# Patient Record
Sex: MALE | Race: WHITE | NOT HISPANIC OR LATINO | Employment: FULL TIME | URBAN - METROPOLITAN AREA
[De-identification: names, ages, dates, MRNs, and addresses within clinical notes are randomized per-mention and may not be internally consistent; named-entity substitution may affect disease eponyms.]

---

## 2020-09-23 ENCOUNTER — OFFICE VISIT (OUTPATIENT)
Dept: INTERNAL MEDICINE | Facility: CLINIC | Age: 40
End: 2020-09-23
Payer: COMMERCIAL

## 2020-09-23 VITALS
HEIGHT: 72 IN | RESPIRATION RATE: 20 BRPM | TEMPERATURE: 99 F | DIASTOLIC BLOOD PRESSURE: 82 MMHG | OXYGEN SATURATION: 96 % | SYSTOLIC BLOOD PRESSURE: 136 MMHG | HEART RATE: 97 BPM | WEIGHT: 251.56 LBS | BODY MASS INDEX: 34.07 KG/M2

## 2020-09-23 DIAGNOSIS — M25.512 ACUTE PAIN OF BOTH SHOULDERS: ICD-10-CM
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
## 2020-09-23 DIAGNOSIS — M25.511 ACUTE PAIN OF BOTH SHOULDERS: ICD-10-CM

## 2020-09-23 PROCEDURE — 99999 PR PBB SHADOW E&M-NEW PATIENT-LVL IV: CPT | Mod: PBBFAC,,, | Performed by: NURSE PRACTITIONER

## 2020-09-23 PROCEDURE — 3008F BODY MASS INDEX DOCD: CPT | Mod: CPTII,S$GLB,, | Performed by: NURSE PRACTITIONER

## 2020-09-23 PROCEDURE — 3008F PR BODY MASS INDEX (BMI) DOCUMENTED: ICD-10-PCS | Mod: CPTII,S$GLB,, | Performed by: NURSE PRACTITIONER

## 2020-09-23 PROCEDURE — 99204 OFFICE O/P NEW MOD 45 MIN: CPT | Mod: S$GLB,,, | Performed by: NURSE PRACTITIONER

## 2020-09-23 PROCEDURE — 99999 PR PBB SHADOW E&M-NEW PATIENT-LVL IV: ICD-10-PCS | Mod: PBBFAC,,, | Performed by: NURSE PRACTITIONER

## 2020-09-23 PROCEDURE — 99204 PR OFFICE/OUTPT VISIT, NEW, LEVL IV, 45-59 MIN: ICD-10-PCS | Mod: S$GLB,,, | Performed by: NURSE PRACTITIONER
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
## 2020-09-28 PROBLEM — M25.512 ACUTE PAIN OF BOTH SHOULDERS: Status: ACTIVE | Noted: 2020-09-28

## 2020-09-28 PROBLEM — M25.511 ACUTE PAIN OF BOTH SHOULDERS: Status: ACTIVE | Noted: 2020-09-28
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
## 2023-03-14 ENCOUNTER — OFFICE VISIT (OUTPATIENT)
Dept: INTERNAL MEDICINE | Facility: CLINIC | Age: 43
End: 2023-03-14

## 2023-03-14 ENCOUNTER — LAB VISIT (OUTPATIENT)
Dept: LAB | Facility: HOSPITAL | Age: 43
End: 2023-03-14
Attending: NURSE PRACTITIONER

## 2023-03-14 VITALS
BODY MASS INDEX: 34.58 KG/M2 | DIASTOLIC BLOOD PRESSURE: 94 MMHG | HEART RATE: 88 BPM | OXYGEN SATURATION: 97 % | HEIGHT: 72 IN | TEMPERATURE: 98 F | SYSTOLIC BLOOD PRESSURE: 142 MMHG | WEIGHT: 255.31 LBS

## 2023-03-14 DIAGNOSIS — Z11.4 SCREENING FOR HIV WITHOUT PRESENCE OF RISK FACTORS: ICD-10-CM

## 2023-03-14 DIAGNOSIS — Z00.00 ROUTINE ADULT HEALTH MAINTENANCE: ICD-10-CM

## 2023-03-14 DIAGNOSIS — Z12.5 PROSTATE CANCER SCREENING: ICD-10-CM

## 2023-03-14 DIAGNOSIS — Z00.00 ROUTINE ADULT HEALTH MAINTENANCE: Primary | ICD-10-CM

## 2023-03-14 DIAGNOSIS — Z13.220 LIPID SCREENING: ICD-10-CM

## 2023-03-14 DIAGNOSIS — Z11.59 ENCOUNTER FOR HEPATITIS C SCREENING TEST FOR LOW RISK PATIENT: ICD-10-CM

## 2023-03-14 DIAGNOSIS — R03.0 ELEVATED BLOOD PRESSURE READING IN OFFICE WITHOUT DIAGNOSIS OF HYPERTENSION: ICD-10-CM

## 2023-03-14 PROBLEM — M25.512 ACUTE PAIN OF BOTH SHOULDERS: Status: RESOLVED | Noted: 2020-09-28 | Resolved: 2023-03-14
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
## 2023-03-14 PROBLEM — M25.511 ACUTE PAIN OF BOTH SHOULDERS: Status: RESOLVED | Noted: 2020-09-28 | Resolved: 2023-03-14

## 2023-03-14 LAB
ALBUMIN SERPL BCP-MCNC: 4.6 G/DL (ref 3.5–5.2)
ALP SERPL-CCNC: 65 U/L (ref 55–135)
ALT SERPL W/O P-5'-P-CCNC: 37 U/L (ref 10–44)
ANION GAP SERPL CALC-SCNC: 9 MMOL/L (ref 8–16)
AST SERPL-CCNC: 26 U/L (ref 10–40)
BASOPHILS # BLD AUTO: 0.05 K/UL (ref 0–0.2)
BASOPHILS NFR BLD: 0.8 % (ref 0–1.9)
BILIRUB SERPL-MCNC: 0.5 MG/DL (ref 0.1–1)
BUN SERPL-MCNC: 13 MG/DL (ref 6–20)
CALCIUM SERPL-MCNC: 9.4 MG/DL (ref 8.7–10.5)
CHLORIDE SERPL-SCNC: 107 MMOL/L (ref 95–110)
CO2 SERPL-SCNC: 25 MMOL/L (ref 23–29)
CREAT SERPL-MCNC: 0.9 MG/DL (ref 0.5–1.4)
DIFFERENTIAL METHOD: ABNORMAL
EOSINOPHIL # BLD AUTO: 0.3 K/UL (ref 0–0.5)
EOSINOPHIL NFR BLD: 4.3 % (ref 0–8)
ERYTHROCYTE [DISTWIDTH] IN BLOOD BY AUTOMATED COUNT: 11.8 % (ref 11.5–14.5)
EST. GFR  (NO RACE VARIABLE): >60 ML/MIN/1.73 M^2
GLUCOSE SERPL-MCNC: 93 MG/DL (ref 70–110)
HCT VFR BLD AUTO: 45.8 % (ref 40–54)
HCV AB SERPL QL IA: NEGATIVE
HGB BLD-MCNC: 16.4 G/DL (ref 14–18)
HIV 1+2 AB+HIV1 P24 AG SERPL QL IA: NEGATIVE
IMM GRANULOCYTES # BLD AUTO: 0.02 K/UL (ref 0–0.04)
IMM GRANULOCYTES NFR BLD AUTO: 0.3 % (ref 0–0.5)
LYMPHOCYTES # BLD AUTO: 2.1 K/UL (ref 1–4.8)
LYMPHOCYTES NFR BLD: 33.9 % (ref 18–48)
MCH RBC QN AUTO: 33.3 PG (ref 27–31)
MCHC RBC AUTO-ENTMCNC: 35.8 G/DL (ref 32–36)
MCV RBC AUTO: 93 FL (ref 82–98)
MONOCYTES # BLD AUTO: 0.4 K/UL (ref 0.3–1)
MONOCYTES NFR BLD: 6.6 % (ref 4–15)
NEUTROPHILS # BLD AUTO: 3.4 K/UL (ref 1.8–7.7)
NEUTROPHILS NFR BLD: 54.1 % (ref 38–73)
NRBC BLD-RTO: 0 /100 WBC
PLATELET # BLD AUTO: 257 K/UL (ref 150–450)
PMV BLD AUTO: 8.9 FL (ref 9.2–12.9)
POTASSIUM SERPL-SCNC: 4.4 MMOL/L (ref 3.5–5.1)
PROT SERPL-MCNC: 7.5 G/DL (ref 6–8.4)
RBC # BLD AUTO: 4.93 M/UL (ref 4.6–6.2)
SODIUM SERPL-SCNC: 141 MMOL/L (ref 136–145)
WBC # BLD AUTO: 6.22 K/UL (ref 3.9–12.7)

## 2023-03-14 PROCEDURE — 83036 HEMOGLOBIN GLYCOSYLATED A1C: CPT | Performed by: NURSE PRACTITIONER

## 2023-03-14 PROCEDURE — 84153 ASSAY OF PSA TOTAL: CPT | Performed by: NURSE PRACTITIONER

## 2023-03-14 PROCEDURE — 99396 PR PREVENTIVE VISIT,EST,40-64: ICD-10-PCS | Mod: S$PBB,,, | Performed by: NURSE PRACTITIONER

## 2023-03-14 PROCEDURE — 85025 COMPLETE CBC W/AUTO DIFF WBC: CPT | Mod: PO | Performed by: NURSE PRACTITIONER

## 2023-03-14 PROCEDURE — 99999 PR PBB SHADOW E&M-EST. PATIENT-LVL III: ICD-10-PCS | Mod: PBBFAC,,, | Performed by: NURSE PRACTITIONER

## 2023-03-14 PROCEDURE — 99213 OFFICE O/P EST LOW 20 MIN: CPT | Mod: PBBFAC,PO | Performed by: NURSE PRACTITIONER

## 2023-03-14 PROCEDURE — 87389 HIV-1 AG W/HIV-1&-2 AB AG IA: CPT | Performed by: NURSE PRACTITIONER

## 2023-03-14 PROCEDURE — 36415 COLL VENOUS BLD VENIPUNCTURE: CPT | Mod: PO | Performed by: NURSE PRACTITIONER

## 2023-03-14 PROCEDURE — 99999 PR PBB SHADOW E&M-EST. PATIENT-LVL III: CPT | Mod: PBBFAC,,, | Performed by: NURSE PRACTITIONER

## 2023-03-14 PROCEDURE — 80061 LIPID PANEL: CPT | Performed by: NURSE PRACTITIONER

## 2023-03-14 PROCEDURE — 80053 COMPREHEN METABOLIC PANEL: CPT | Mod: PO | Performed by: NURSE PRACTITIONER

## 2023-03-14 PROCEDURE — 99396 PREV VISIT EST AGE 40-64: CPT | Mod: S$PBB,,, | Performed by: NURSE PRACTITIONER

## 2023-03-14 PROCEDURE — 86803 HEPATITIS C AB TEST: CPT | Performed by: NURSE PRACTITIONER

## 2023-03-14 NOTE — ASSESSMENT & PLAN NOTE
No concerns on physical exam except elevated BP. Follow up in two weeks with BP log to make sure home readings are elevated also. Discussed BP goal. Routine labs today.

## 2023-03-14 NOTE — PROGRESS NOTES
Subjective:       Patient ID: Vince Rosas is a 42 y.o. male.    Chief Complaint: Annual Exam    Mr Rosas presents to visit for annual wellness visit and labs. No acute complaints today. BP elevated today. Reports that it is elevated when they take it at work also. Improves with weight loss. Plans to start intermittent fasting again and exercising. Had significant weight loss with this method in past.     Regular dental and eye exams.       Patient Active Problem List   Diagnosis    Routine adult health maintenance    Elevated blood pressure reading in office without diagnosis of hypertension       Family History   Problem Relation Age of Onset    COPD Mother     Parkinsonism Mother      History reviewed. No pertinent surgical history.    No current outpatient medications on file.    Review of Systems   Constitutional:  Negative for chills and fever.   Eyes:  Negative for visual disturbance.   Respiratory:  Negative for cough and shortness of breath.    Cardiovascular:  Negative for chest pain, palpitations and leg swelling.   Gastrointestinal:  Negative for abdominal pain, constipation, diarrhea, nausea and vomiting.   Genitourinary:  Negative for frequency and urgency.   Musculoskeletal:  Negative for arthralgias, gait problem and myalgias.   Neurological:  Negative for dizziness, light-headedness, numbness and headaches.   Psychiatric/Behavioral:  Negative for dysphoric mood and sleep disturbance. The patient is not nervous/anxious.      Objective:   BP (!) 142/94 (BP Location: Left arm, Patient Position: Sitting, BP Method: Medium (Manual))   Pulse 88   Temp 97.7 °F (36.5 °C) (Temporal)   Ht 6' (1.829 m)   Wt 115.8 kg (255 lb 4.7 oz)   SpO2 97%   BMI 34.62 kg/m²      Physical Exam  Vitals reviewed.   Constitutional:       General: He is not in acute distress.     Appearance: Normal appearance. He is well-developed. He is not ill-appearing or diaphoretic.   HENT:      Head: Normocephalic.      Right Ear:  Tympanic membrane normal.      Left Ear: Tympanic membrane normal.      Mouth/Throat:      Mouth: Mucous membranes are moist.      Pharynx: Oropharynx is clear. No oropharyngeal exudate or posterior oropharyngeal erythema.   Eyes:      General:         Right eye: No discharge.         Left eye: No discharge.      Extraocular Movements: Extraocular movements intact.      Conjunctiva/sclera: Conjunctivae normal.      Pupils: Pupils are equal, round, and reactive to light.   Cardiovascular:      Rate and Rhythm: Normal rate and regular rhythm.      Pulses: Normal pulses.      Heart sounds: Normal heart sounds. No murmur heard.    No friction rub. No gallop.   Pulmonary:      Effort: Pulmonary effort is normal. No respiratory distress.      Breath sounds: Normal breath sounds. No rales.   Abdominal:      Palpations: Abdomen is soft.      Tenderness: There is no abdominal tenderness.   Musculoskeletal:         General: Normal range of motion.      Cervical back: Normal range of motion and neck supple. No tenderness.      Right lower leg: No edema.      Left lower leg: No edema.   Lymphadenopathy:      Cervical: No cervical adenopathy.   Skin:     General: Skin is warm and dry.      Coloration: Skin is not pale.      Findings: No erythema.   Neurological:      Mental Status: He is alert and oriented to person, place, and time.   Psychiatric:         Mood and Affect: Mood normal.         Behavior: Behavior normal.       Assessment & Plan     Problem List Items Addressed This Visit          Cardiac/Vascular    Elevated blood pressure reading in office without diagnosis of hypertension    Current Assessment & Plan     BP log for next two weeks. Follow up for nurse visit.             Other    Routine adult health maintenance - Primary    Current Assessment & Plan     No concerns on physical exam except elevated BP. Follow up in two weeks with BP log to make sure home readings are elevated also. Discussed BP goal. Routine labs  "today.          Relevant Orders    CBC Auto Differential (Completed)    HIV 1/2 Ag/Ab (4th Gen)    Hemoglobin A1C    PSA, Screening    Hepatitis C Antibody    COMPREHENSIVE METABOLIC PANEL (Completed)     Other Visit Diagnoses       Prostate cancer screening        Relevant Orders    PSA, Screening    Encounter for hepatitis C screening test for low risk patient        Relevant Orders    Hepatitis C Antibody    Screening for HIV without presence of risk factors        Relevant Orders    HIV 1/2 Ag/Ab (4th Gen)    Lipid screening        Relevant Orders    Lipid Panel            Follow up in about 2 weeks (around 3/28/2023) for hypertension nurse visit. .          Portions of this note may have been created with voice recognition software. Occasional "wrong-word" or "sound-a-like" substitutions may have occurred due to the inherent limitations of voice recognition software. Please, read the note carefully and recognize, using context, where substitutions have occurred.       "

## 2023-03-15 LAB
CHOLEST SERPL-MCNC: 191 MG/DL (ref 120–199)
CHOLEST/HDLC SERPL: 4 {RATIO} (ref 2–5)
COMPLEXED PSA SERPL-MCNC: 0.79 NG/ML (ref 0–4)
ESTIMATED AVG GLUCOSE: 94 MG/DL (ref 68–131)
HBA1C MFR BLD: 4.9 % (ref 4–5.6)
HDLC SERPL-MCNC: 48 MG/DL (ref 40–75)
HDLC SERPL: 25.1 % (ref 20–50)
LDLC SERPL CALC-MCNC: 109.2 MG/DL (ref 63–159)
NONHDLC SERPL-MCNC: 143 MG/DL
TRIGL SERPL-MCNC: 169 MG/DL (ref 30–150)

## 2023-06-19 PROBLEM — Z00.00 ROUTINE ADULT HEALTH MAINTENANCE: Status: RESOLVED | Noted: 2023-03-14 | Resolved: 2023-06-19

## 2024-12-05 ENCOUNTER — OFFICE VISIT (OUTPATIENT)
Dept: INTERNAL MEDICINE | Facility: CLINIC | Age: 44
End: 2024-12-05
Payer: COMMERCIAL

## 2024-12-05 ENCOUNTER — HOSPITAL ENCOUNTER (OUTPATIENT)
Dept: RADIOLOGY | Facility: HOSPITAL | Age: 44
Discharge: HOME | End: 2024-12-05
Attending: NURSE PRACTITIONER
Payer: COMMERCIAL

## 2024-12-05 VITALS
HEART RATE: 87 BPM | TEMPERATURE: 96 F | SYSTOLIC BLOOD PRESSURE: 144 MMHG | OXYGEN SATURATION: 95 % | RESPIRATION RATE: 18 BRPM | HEIGHT: 72 IN | BODY MASS INDEX: 36.28 KG/M2 | DIASTOLIC BLOOD PRESSURE: 94 MMHG | WEIGHT: 267.88 LBS

## 2024-12-05 DIAGNOSIS — M54.42 ACUTE LEFT-SIDED LOW BACK PAIN WITH LEFT-SIDED SCIATICA: Primary | ICD-10-CM

## 2024-12-05 DIAGNOSIS — R06.83 SNORES: ICD-10-CM

## 2024-12-05 DIAGNOSIS — I10 HYPERTENSION, UNSPECIFIED TYPE: ICD-10-CM

## 2024-12-05 DIAGNOSIS — M54.42 ACUTE LEFT-SIDED LOW BACK PAIN WITH LEFT-SIDED SCIATICA: ICD-10-CM

## 2024-12-05 PROCEDURE — 3077F SYST BP >= 140 MM HG: CPT | Mod: CPTII,S$GLB,, | Performed by: NURSE PRACTITIONER

## 2024-12-05 PROCEDURE — 96372 THER/PROPH/DIAG INJ SC/IM: CPT | Mod: S$GLB,,, | Performed by: NURSE PRACTITIONER

## 2024-12-05 PROCEDURE — 3080F DIAST BP >= 90 MM HG: CPT | Mod: CPTII,S$GLB,, | Performed by: NURSE PRACTITIONER

## 2024-12-05 PROCEDURE — 99999 PR PBB SHADOW E&M-EST. PATIENT-LVL IV: CPT | Mod: PBBFAC,,, | Performed by: NURSE PRACTITIONER

## 2024-12-05 PROCEDURE — 72100 X-RAY EXAM L-S SPINE 2/3 VWS: CPT | Mod: TC,PO

## 2024-12-05 PROCEDURE — 1159F MED LIST DOCD IN RCRD: CPT | Mod: CPTII,S$GLB,, | Performed by: NURSE PRACTITIONER

## 2024-12-05 PROCEDURE — 3008F BODY MASS INDEX DOCD: CPT | Mod: CPTII,S$GLB,, | Performed by: NURSE PRACTITIONER

## 2024-12-05 PROCEDURE — 99214 OFFICE O/P EST MOD 30 MIN: CPT | Mod: 25,S$GLB,, | Performed by: NURSE PRACTITIONER

## 2024-12-05 PROCEDURE — 72100 X-RAY EXAM L-S SPINE 2/3 VWS: CPT | Mod: 26,,, | Performed by: RADIOLOGY

## 2024-12-05 RX ORDER — METHOCARBAMOL 500 MG/1
500 TABLET, FILM COATED ORAL 3 TIMES DAILY PRN
Qty: 30 TABLET | Refills: 0 | Status: SHIPPED | OUTPATIENT
Start: 2024-12-05

## 2024-12-05 RX ORDER — NAPROXEN 500 MG/1
500 TABLET ORAL 2 TIMES DAILY PRN
Qty: 30 TABLET | Refills: 0 | Status: SHIPPED | OUTPATIENT
Start: 2024-12-05

## 2024-12-05 RX ORDER — AMLODIPINE BESYLATE 5 MG/1
5 TABLET ORAL DAILY
Qty: 30 TABLET | Refills: 0 | Status: SHIPPED | OUTPATIENT
Start: 2024-12-05

## 2024-12-05 RX ORDER — METHYLPREDNISOLONE 4 MG/1
TABLET ORAL
Qty: 21 TABLET | Refills: 0 | Status: SHIPPED | OUTPATIENT
Start: 2024-12-05

## 2024-12-05 RX ORDER — KETOROLAC TROMETHAMINE 30 MG/ML
30 INJECTION, SOLUTION INTRAMUSCULAR; INTRAVENOUS
Status: COMPLETED | OUTPATIENT
Start: 2024-12-05 | End: 2024-12-05

## 2024-12-05 RX ADMIN — KETOROLAC TROMETHAMINE 30 MG: 30 INJECTION, SOLUTION INTRAMUSCULAR; INTRAVENOUS at 10:12

## 2024-12-05 NOTE — PROGRESS NOTES
Subjective:       Patient ID: Vince Rosas is a 44 y.o. male.    Chief Complaint: Back Pain    Mr. Rosas presents for complaint of back pain that has been going on for approximately 1-2 months.  Exacerbated when he was picking up on dear feed over the past few weeks.  Currently rated a 5/10.  Worse with movements and turning.  Patient has tried OTC Tylenol, anti-inflammatories, and muscle relaxers with minimal relief.  Denies any urinary or GI symptoms.      BP elevated today.  Patient reports that his blood pressure is always elevated at work physicals as well.  No current medication treatment.    Patient would also like to inquire about possible sleep study.  He reports that he often has daytime fatigue, and also is told that he snores all the time. Has never complete sleep study.       Back Pain  This is a new problem. The current episode started more than 1 month ago. The problem occurs intermittently. The problem has been waxing and waning since onset. The pain is present in the lumbar spine. The quality of the pain is described as aching. The pain is at a severity of 5/10. The pain is moderate. The symptoms are aggravated by lying down, twisting, sitting, bending and position. Associated symptoms include tingling (left). Pertinent negatives include no abdominal pain, bladder incontinence, bowel incontinence, chest pain, dysuria, fever, headaches, numbness or paresis. He has tried muscle relaxant and walking for the symptoms.       Patient Active Problem List   Diagnosis    Elevated blood pressure reading in office without diagnosis of hypertension    Gastroesophageal reflux disease without esophagitis    Hypertension    Acute left-sided low back pain with left-sided sciatica       History reviewed. No pertinent surgical history.      Current Outpatient Medications:     amLODIPine (NORVASC) 5 MG tablet, Take 1 tablet (5 mg total) by mouth once daily., Disp: 30 tablet, Rfl: 0    methocarbamoL (ROBAXIN) 500 MG  Tab, Take 1 tablet (500 mg total) by mouth 3 (three) times daily as needed (muscle pain). May cause drowsiness., Disp: 30 tablet, Rfl: 0    methylPREDNISolone (MEDROL DOSEPACK) 4 mg tablet, follow package directions, Disp: 21 tablet, Rfl: 0    naproxen (NAPROSYN) 500 MG tablet, Take 1 tablet (500 mg total) by mouth 2 (two) times daily as needed., Disp: 30 tablet, Rfl: 0    Review of Systems   Constitutional:  Negative for fever.   Cardiovascular:  Negative for chest pain.   Gastrointestinal:  Negative for abdominal pain and bowel incontinence.   Genitourinary:  Negative for bladder incontinence and dysuria.   Musculoskeletal:  Positive for back pain.   Neurological:  Positive for tingling (left). Negative for numbness and headaches.       Objective:   BP (!) 144/94 (BP Location: Left arm, Patient Position: Sitting)   Pulse 87   Temp 96.4 °F (35.8 °C) (Tympanic)   Resp 18   Ht 6' (1.829 m)   Wt 121.5 kg (267 lb 13.7 oz)   SpO2 95%   BMI 36.33 kg/m²      Physical Exam  Vitals reviewed.   Constitutional:       General: He is not in acute distress.     Appearance: Normal appearance. He is well-developed. He is obese. He is not ill-appearing or diaphoretic.   HENT:      Head: Normocephalic.   Cardiovascular:      Rate and Rhythm: Normal rate and regular rhythm.      Pulses: Normal pulses.      Heart sounds: Normal heart sounds. No murmur heard.     No friction rub. No gallop.   Pulmonary:      Effort: Pulmonary effort is normal. No respiratory distress.      Breath sounds: Normal breath sounds. No rales.   Musculoskeletal:      Cervical back: Normal range of motion and neck supple.      Lumbar back: Tenderness present. Decreased range of motion. Positive left straight leg raise test. Negative right straight leg raise test.   Skin:     General: Skin is warm and dry.      Coloration: Skin is not pale.      Findings: No erythema.   Neurological:      Mental Status: He is alert and oriented to person, place, and time.  "  Psychiatric:         Mood and Affect: Mood normal.         Behavior: Behavior normal.         Assessment & Plan     Problem List Items Addressed This Visit          Cardiac/Vascular    Hypertension    Current Assessment & Plan     Reports BP is always elevated. Will add on amlodipine. Follow up in two weeks nurse visit.          Relevant Medications    amLODIPine (NORVASC) 5 MG tablet       Orthopedic    Acute left-sided low back pain with left-sided sciatica - Primary    Current Assessment & Plan     Stretches  Heating pad  Massages  NSAID/muscle relaxer rx.   Referral to PT.          Relevant Medications    methocarbamoL (ROBAXIN) 500 MG Tab    naproxen (NAPROSYN) 500 MG tablet    methylPREDNISolone (MEDROL DOSEPACK) 4 mg tablet    Other Relevant Orders    X-Ray Lumbar Spine 2 Or 3 Views (Completed)     Other Visit Diagnoses       Snores        Relevant Orders    Home Sleep Study          Follow up in about 2 weeks (around 12/19/2024), or if symptoms worsen or fail to improve, for hypertension nurse visit. .            Portions of this note may have been created with voice recognition software. Occasional "wrong-word" or "sound-a-like" substitutions may have occurred due to the inherent limitations of voice recognition software. Please, read the note carefully and recognize, using context, where substitutions have occurred.     "

## 2024-12-06 ENCOUNTER — TELEPHONE (OUTPATIENT)
Dept: PULMONOLOGY | Facility: CLINIC | Age: 44
End: 2024-12-06
Payer: COMMERCIAL

## 2024-12-06 NOTE — TELEPHONE ENCOUNTER
----- Message from Zeny Joel sent at 12/5/2024 10:47 AM CST -----  Review Chart, [facility]

## 2024-12-11 DIAGNOSIS — I10 HYPERTENSION, UNSPECIFIED TYPE: ICD-10-CM

## 2024-12-16 PROBLEM — I10 HYPERTENSION: Status: ACTIVE | Noted: 2024-12-16

## 2024-12-16 PROBLEM — M54.42 ACUTE LEFT-SIDED LOW BACK PAIN WITH LEFT-SIDED SCIATICA: Status: ACTIVE | Noted: 2024-12-16

## 2024-12-31 ENCOUNTER — HOSPITAL ENCOUNTER (OUTPATIENT)
Dept: SLEEP MEDICINE | Facility: HOSPITAL | Age: 44
Discharge: HOME | End: 2024-12-31
Payer: COMMERCIAL

## 2024-12-31 DIAGNOSIS — G47.33 OSA (OBSTRUCTIVE SLEEP APNEA): Primary | ICD-10-CM

## 2024-12-31 DIAGNOSIS — R06.83 SNORES: ICD-10-CM

## 2024-12-31 PROCEDURE — 95806 SLEEP STUDY UNATT&RESP EFFT: CPT | Performed by: INTERNAL MEDICINE

## 2025-01-02 DIAGNOSIS — G47.33 OSA (OBSTRUCTIVE SLEEP APNEA): Primary | ICD-10-CM

## 2025-01-02 PROBLEM — R06.83 SNORES: Status: ACTIVE | Noted: 2025-01-02

## 2025-01-02 PROCEDURE — 95806 SLEEP STUDY UNATT&RESP EFFT: CPT | Mod: 26,,, | Performed by: INTERNAL MEDICINE

## 2025-01-02 NOTE — PROCEDURES
PHYSICIAN INTERPRETATION AND COMMENTS: Findings are consistent with moderate, positional obstructive sleep  apnea(CANDIDA). Please refer to Sleep Disorder Clinic for management.  CLINICAL HISTORY: 44 year old male. BMI 36.33 kg per m2. Snoring and daytime fatigue.  SLEEP STUDY FINDINGS: Patient underwent a 1 night Home Sleep Test and by behavioral criteria, slept for approximately  5.08 hours, with a sleep latency of 6 minutes and a sleep efficiency of 99%. Moderate sleep disordered breathing (AHI=15)  is noted based on a 4% hypopnea desaturation criteria, predominantly in the supine position (24 events/hour). The patient  slept supine 44.7% of the night based on valid recording time of 4.23 hours and is 3 times as likely to have  apneas/hypopneas when supine. When considering more subtle measures of sleep disordered breathing, the overall  respiratory disturbance index is moderate(RDI=29) based on a 1% hypopnea desaturation criteria with confirmation by  surrogate arousal indicators. The apneas/hypopneas are accompanied by minimal oxygen desaturation (percent time  below 90% SpO2: 3%, Min SpO2: 69.5%). The average desaturation across all sleep disordered breathing events is 4.5%.  Snoring occurs for 47.9% (30 dB) of the study, 34.9% is very loud. The mean pulse rate is 69 BPM, with frequent pulse rate  variability (59 events with >= 6 BPM increase/decrease per hour).  TREATMENT CONSIDERATIONS: 1) Auto-CPAP set 4-20 cm H2O with heated humidity and mask/interface fitting. Close  follow up and monitoring is recommended to adjust pressures/masks if necessary 2) Alternate treatment options  including oral appliance therapy (OAT), daytime neuromuscular stimulation Positional therapy, and/or surgical  procedures for CANDIDA may be considered based on severity and comorbidities, if PAP is not tolerated or in combination with  PAP 3) Avoid alcohol, sedatives and other CNS depressants that may worsen sleep apnea and disrupt normal  "sleep  architecture. 4) Sleep hygiene should be reviewed to assess factors that may improve sleep quality. 5) If the patient has a  BMI > 25, weight management and regular exercise should be initiated or continued. 6) Avoid driving and handling  machinery/equipment if sleepy    [place], [place]  [address] [address]  [place] [address]/Marie Hinkle NP         The sleep study that you ordered is complete.  You have ordered sleep LAB services to perform the sleep study for Vince Rosas .      Please find Sleep Study result in  the "Media tab" of Chart Review menu.        You can look  for the report in the  Media by the document type "Sleep Study Documents". Alphabetizing  "Document type" column helps to find the SLEEP STUDY report  Faster.       As the ordering provider, you are responsible for reviewing the results and implementing a treatment plan with your patient.    If you need a Sleep Medicine provider to explain the sleep study findings and arrange treatment for the patient, please refer patient for consultation to our Sleep Clinic via [facility] with Ambulatory Consult Sleep.     To do that please place an order for an  "Ambulatory Consult Sleep" -  order , it will go to our clinic work queue for our staff  to contact the patient for an appointment.      For any questions, please contact our sleep lab  staff at [phone] to talk to clinical staff          Niko Vu MD   "

## 2025-01-03 DIAGNOSIS — M54.42 ACUTE LEFT-SIDED LOW BACK PAIN WITH LEFT-SIDED SCIATICA: Primary | ICD-10-CM

## 2025-01-08 ENCOUNTER — OFFICE VISIT (OUTPATIENT)
Dept: INTERNAL MEDICINE | Facility: CLINIC | Age: 45
End: 2025-01-08
Payer: COMMERCIAL

## 2025-01-08 VITALS
DIASTOLIC BLOOD PRESSURE: 110 MMHG | OXYGEN SATURATION: 96 % | RESPIRATION RATE: 18 BRPM | WEIGHT: 270.94 LBS | BODY MASS INDEX: 36.7 KG/M2 | TEMPERATURE: 98 F | HEART RATE: 100 BPM | HEIGHT: 72 IN | SYSTOLIC BLOOD PRESSURE: 142 MMHG

## 2025-01-08 DIAGNOSIS — M51.369 BULGE OF LUMBAR DISC WITHOUT MYELOPATHY: ICD-10-CM

## 2025-01-08 DIAGNOSIS — M54.16 LUMBAR RADICULOPATHY: Primary | ICD-10-CM

## 2025-01-08 DIAGNOSIS — I10 ESSENTIAL HYPERTENSION: ICD-10-CM

## 2025-01-08 DIAGNOSIS — M54.16 LUMBAR RADICULOPATHY, CHRONIC: ICD-10-CM

## 2025-01-08 DIAGNOSIS — I10 HYPERTENSION, UNSPECIFIED TYPE: ICD-10-CM

## 2025-01-08 PROCEDURE — 3077F SYST BP >= 140 MM HG: CPT | Mod: CPTII,S$GLB,, | Performed by: NURSE PRACTITIONER

## 2025-01-08 PROCEDURE — 3008F BODY MASS INDEX DOCD: CPT | Mod: CPTII,S$GLB,, | Performed by: NURSE PRACTITIONER

## 2025-01-08 PROCEDURE — 99999 PR PBB SHADOW E&M-EST. PATIENT-LVL IV: CPT | Mod: PBBFAC,,, | Performed by: NURSE PRACTITIONER

## 2025-01-08 PROCEDURE — 99214 OFFICE O/P EST MOD 30 MIN: CPT | Mod: S$GLB,,, | Performed by: NURSE PRACTITIONER

## 2025-01-08 PROCEDURE — 3080F DIAST BP >= 90 MM HG: CPT | Mod: CPTII,S$GLB,, | Performed by: NURSE PRACTITIONER

## 2025-01-08 RX ORDER — AMLODIPINE BESYLATE 10 MG/1
10 TABLET ORAL DAILY
Qty: 30 TABLET | Refills: 0 | Status: SHIPPED | OUTPATIENT
Start: 2025-01-08 | End: 2025-01-15 | Stop reason: SDUPTHER

## 2025-01-08 NOTE — PROGRESS NOTES
Subjective:       Patient ID: Vince Rosas is a 44 y.o. male.    Chief Complaint: Back Pain      History of Present Illness    CHIEF COMPLAINT:  - Mr. Rosas presents with back pain, knee pain, and leg numbness/tingling, seeking follow-up on physical therapy and considering work leave due to pain and functional limitations.    HPI:  Mr. Rosas reports back pain, knee pain, and overall body aches, expressing a sense of premature aging. He has begun physical therapy with [facility], attending two sessions. The physical therapist noted some improvement but expressed concern about the patient returning to work, particularly due to prolonged sitting exacerbating the condition.    Mr. Rosas describes numbness and tingling along the entire left side of his leg, which became particularly noticeable after a recent physical therapy session involving dry needling. After standing following the treatment, he felt tingling throughout the affected area.    The symptoms are impacting his ability to work, necessitating a decision about whether to take leave or continue working. He expresses concern about his ability to perform work tasks, especially those involving sitting for extended periods. Mr. Rosas also reports difficulty climbing stairs, stating it caused significant pain and potentially worsened his condition.    Mr. Rosas's leg occasionally feels weak and gives out, though this is not constant. He has made adjustments at work, such as elevating his workstation to allow for standing and movement.    Prior to starting physical therapy, the patient visited an urgent care facility once, and also office visit here due to his symptoms. X-rays were taken, which did not show any abnormalities.    IMAGING:  - X-ray Lumbar Spine: Completed prior to the conversation, negative    SOCIAL HISTORY:  - Occupation: Currently employed, job involves sitting for long periods    EXAM: XR LUMBAR SPINE 2 OR 3 VIEWS     CLINICAL HISTORY:  [M54.42]-Lumbago  with sciatica, left side.     Lumbar spine x-ray, 3 views     COMPARISON: None.        FINDINGS:     Normal alignment and normal lumbar vertebral body heights.  Disc spaces well preserved.  Vertebral soft tissues demonstrates minimal vascular calcification of aorta.        Impression:        Negative lumbar spine exam.     Finalized on: 12/5/2024 5:55 PM By:  Victor M Mccracken MD  Patient Active Problem List   Diagnosis    Elevated blood pressure reading in office without diagnosis of hypertension    Gastroesophageal reflux disease without esophagitis    Acute left-sided low back pain with left-sided sciatica    Snores    Essential hypertension    BMI 36.0-36.9,adult    CANDIDA (obstructive sleep apnea)         History reviewed. No pertinent surgical history.      Current Outpatient Medications:     methocarbamoL (ROBAXIN) 500 MG Tab, Take 1 tablet (500 mg total) by mouth 3 (three) times daily as needed (muscle pain). May cause drowsiness., Disp: 30 tablet, Rfl: 0    amLODIPine (NORVASC) 10 MG tablet, Take 1 tablet (10 mg total) by mouth once daily., Disp: 90 tablet, Rfl: 3    naproxen (NAPROSYN) 500 MG tablet, Take 1 tablet (500 mg total) by mouth 2 (two) times daily as needed., Disp: 40 tablet, Rfl: 5    Review of Systems   Constitutional:  Negative for appetite change, chills, fatigue and fever.   Respiratory:  Negative for cough and shortness of breath.    Cardiovascular:  Negative for chest pain and palpitations.   Gastrointestinal:  Negative for abdominal pain, constipation, diarrhea, nausea and vomiting.   Genitourinary:  Negative for dysuria, flank pain, frequency and urgency.   Musculoskeletal:  Positive for back pain, gait problem and myalgias.   Neurological:  Positive for weakness (l leg, intermittent) and numbness. Negative for dizziness, seizures, speech difficulty, light-headedness and headaches.       Objective:   BP (!) 142/110 (BP Location: Left arm, Patient Position: Sitting)   Pulse 100   Temp  98.2 °F (36.8 °C) (Oral)   Resp 18   Ht 6' (1.829 m)   Wt 122.9 kg (270 lb 15.1 oz)   SpO2 96%   BMI 36.75 kg/m²      Physical Exam  Vitals reviewed.   Constitutional:       General: He is not in acute distress.     Appearance: Normal appearance. He is well-developed. He is obese. He is not ill-appearing or diaphoretic.   HENT:      Head: Normocephalic.   Cardiovascular:      Rate and Rhythm: Normal rate and regular rhythm.      Pulses: Normal pulses.      Heart sounds: Normal heart sounds. No murmur heard.     No friction rub. No gallop.   Pulmonary:      Effort: Pulmonary effort is normal. No respiratory distress.      Breath sounds: Normal breath sounds. No rales.   Musculoskeletal:      Lumbar back: Tenderness present. Decreased range of motion. Positive left straight leg raise test. Negative right straight leg raise test.   Skin:     General: Skin is warm and dry.      Coloration: Skin is not pale.      Findings: No erythema.   Neurological:      Mental Status: He is alert and oriented to person, place, and time.   Psychiatric:         Mood and Affect: Mood normal.         Behavior: Behavior normal.         Assessment & Plan     Problem List Items Addressed This Visit          Cardiac/Vascular    Essential hypertension    Current Assessment & Plan     Blood pressure stable on current medication.  Continue current dose.         RESOLVED: Hypertension    Relevant Medications    amLODIPine (NORVASC) 10 MG tablet     Other Visit Diagnoses       Lumbar radiculopathy    -  Primary    Lumbar radiculopathy, chronic        Relevant Orders    MRI Lumbar Spine Without Contrast (Completed)            Assessment & Plan    MEDICAL DECISION MAKING:  - Considering MRI to evaluate for potential disc issues, despite negative x-ray, due to worsening symptoms including radiation down left leg and tingling  - Assessing patient's ability to continue work given current symptoms and physical therapy progress  - Evaluating need  "for medical leave to allow for proper recovery and avoid exacerbation from prolonged sitting at work    PATIENT EDUCATION:  - Explained lumbar nerve pathways and potential areas of radiation for different disc levels (L1-L5 and sacral)  - Discussed how nerve compression can lead to numbness and tingling sensations    ACTION ITEMS/LIFESTYLE:  - Mr. Rosas to avoid prolonged sitting at work to prevent exacerbation of symptoms  - Recommend using ergonomic equipment at work, such as standing desks or elevated workstations  - Mr. oRsas to drop off FMLA paperwork for completion  - Mr. Rosas to inform work about taking leave starting Saturday after FMLA paperwork is completed    ORDERS:  - MRI of lumbar spine ordered to evaluate for potential disc issues    FOLLOW UP:  - Contact the office to drop off FMLA paperwork for completion  - Follow up after FMLA paperwork is completed to ensure coverage for Saturday      Continue physical therapy as scheduled.     Follow up if symptoms worsen or fail to improve.          Portions of this note may have been created with voice recognition software. Occasional "wrong-word" or "sound-a-like" substitutions may have occurred due to the inherent limitations of voice recognition software. Please, read the note carefully and recognize, using context, where substitutions have occurred.       This note was generated with the assistance of ambient listening technology. Verbal consent was obtained by the patient and accompanying visitor(s) for the recording of patient appointment to facilitate this note. I attest to having reviewed and edited the generated note for accuracy, though some syntax or spelling errors may persist. Please contact the author of this note for any clarification.       "

## 2025-01-09 ENCOUNTER — OFFICE VISIT (OUTPATIENT)
Dept: SLEEP MEDICINE | Facility: CLINIC | Age: 45
End: 2025-01-09
Payer: COMMERCIAL

## 2025-01-09 VITALS
BODY MASS INDEX: 36.25 KG/M2 | HEIGHT: 72 IN | WEIGHT: 267.63 LBS | RESPIRATION RATE: 13 BRPM | DIASTOLIC BLOOD PRESSURE: 82 MMHG | SYSTOLIC BLOOD PRESSURE: 132 MMHG | OXYGEN SATURATION: 98 % | HEART RATE: 85 BPM

## 2025-01-09 DIAGNOSIS — I10 ESSENTIAL HYPERTENSION: ICD-10-CM

## 2025-01-09 DIAGNOSIS — G47.33 OSA (OBSTRUCTIVE SLEEP APNEA): Primary | ICD-10-CM

## 2025-01-09 PROCEDURE — 1159F MED LIST DOCD IN RCRD: CPT | Mod: CPTII,S$GLB,, | Performed by: INTERNAL MEDICINE

## 2025-01-09 PROCEDURE — 1160F RVW MEDS BY RX/DR IN RCRD: CPT | Mod: CPTII,S$GLB,, | Performed by: INTERNAL MEDICINE

## 2025-01-09 PROCEDURE — 99204 OFFICE O/P NEW MOD 45 MIN: CPT | Mod: S$GLB,,, | Performed by: INTERNAL MEDICINE

## 2025-01-09 PROCEDURE — 3075F SYST BP GE 130 - 139MM HG: CPT | Mod: CPTII,S$GLB,, | Performed by: INTERNAL MEDICINE

## 2025-01-09 PROCEDURE — 3008F BODY MASS INDEX DOCD: CPT | Mod: CPTII,S$GLB,, | Performed by: INTERNAL MEDICINE

## 2025-01-09 PROCEDURE — 3079F DIAST BP 80-89 MM HG: CPT | Mod: CPTII,S$GLB,, | Performed by: INTERNAL MEDICINE

## 2025-01-09 PROCEDURE — 99999 PR PBB SHADOW E&M-EST. PATIENT-LVL IV: CPT | Mod: PBBFAC,,, | Performed by: INTERNAL MEDICINE

## 2025-01-09 NOTE — PROGRESS NOTES
Pulmonary Outpatient  Visit     Subjective:       Patient ID: Vince Rosas is a 44 y.o. male.    Social History     Tobacco Use   Smoking Status Former   Smokeless Tobacco Never            Chief Complaint: Sleep Apnea          Vince Rosas is 44 y.o.  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 2:00 p.m. goes to 2nd job until 10 pm  Comorbidities hypertension obesity  Sleep latency 5-10 mins  Sleep paralysis  Grind teeth at night  Worry about sleep              Review of Systems    Outpatient Encounter Medications as of 1/9/2025   Medication Sig Dispense Refill    amLODIPine (NORVASC) 10 MG tablet Take 1 tablet (10 mg total) by mouth once daily. 30 tablet 0    methocarbamoL (ROBAXIN) 500 MG Tab Take 1 tablet (500 mg total) by mouth 3 (three) times daily as needed (muscle pain). May cause drowsiness. 30 tablet 0    naproxen (NAPROSYN) 500 MG tablet Take 1 tablet (500 mg total) by mouth 2 (two) times daily as needed. 30 tablet 0    [DISCONTINUED] amLODIPine (NORVASC) 5 MG tablet Take 1 tablet (5 mg total) by mouth once daily. 30 tablet 0    [DISCONTINUED] methylPREDNISolone (MEDROL DOSEPACK) 4 mg tablet follow package directions (Patient not taking: Reported on 1/8/2025) 21 tablet 0     No facility-administered encounter medications on file as of 1/9/2025.           Pertinent Work Up:      Pulmonary Interventions:      Smoking hx:    Environmental/Occupational hx:        Interval Hx:      Occupational History:         The following portions of the patient's history were reviewed and updated as appropriate: He  has a past medical history of Anxiety and  Depression.  He does not have any pertinent problems on file.  He  has no past surgical history on file.  His family history includes COPD in his mother; Parkinsonism in his mother.  He  reports that he has quit smoking. He has never used smokeless tobacco. He reports current alcohol use. He reports that he does not currently use drugs.  He has a current medication list which includes the following prescription(s): amlodipine, methocarbamol, and naproxen.  Current Outpatient Medications on File Prior to Visit   Medication Sig Dispense Refill    amLODIPine (NORVASC) 10 MG tablet Take 1 tablet (10 mg total) by mouth once daily. 30 tablet 0    methocarbamoL (ROBAXIN) 500 MG Tab Take 1 tablet (500 mg total) by mouth 3 (three) times daily as needed (muscle pain). May cause drowsiness. 30 tablet 0    naproxen (NAPROSYN) 500 MG tablet Take 1 tablet (500 mg total) by mouth 2 (two) times daily as needed. 30 tablet 0     No current facility-administered medications on file prior to visit.     He has No Known Allergies..      BP Readings from Last 3 Encounters:   01/09/25 132/82   01/08/25 (!) 142/110   12/05/24 (!) 144/94     Snoring / Sleep:     [place] Questionnaire (validated CANDIDA screening questionnaire)    YES -- Snoring/apnea    YES -- Fatigue    Body mass index is 36.3 kg/m².  (>25 is overweight, >30 is obese)    Blood Pressure = Hypertension  (PreHTN 120-139/80-89, Stg1 140-159/90-99, Stg2 >160/>100)  G[place]ick = 3 of three CANDIDA categories are positive (high risk is 2-3 positive categories)     [place] Sleepiness Scale TOTAL =          1/9/2025   EPWORTH SLEEPINESS SCALE   Sitting and reading 2   Watching TV 3   Sitting, inactive in a public place (e.g. a theatre or a meeting) 2   As a passenger in a car for an hour without a break 0   Lying down to rest in the afternoon when circumstances permit 0   Sitting and talking to someone 0   Sitting quietly after a lunch without alcohol 3   In a car, while stopped for a few  "minutes in traffic 0   Total score 10      (validated sleepiness questionnaire with a higher score indicating greater sleepiness; range 0-24)  Failed to redirect to the Timeline version of the [facility] SmartLink.    STOP-Bang Questionnaire (validated CANDIDA screening questionnaire)  Negative unless checked off.  [x] Snoring    [x]  Tired/Fatigued/Sleepy  [x] Obstruction (apneas/choking)  [x] Pressure (HTN)  [x] BMI >35  [] Age >50  [] Neck >40 cm  [x] Gender male   STOP-Bang = 6 (low risk 0-2,high risk 3-8)    Neck circumference 17"        MMRC Dyspnea Scale (4 is worst)     [] MMRC 0: Dyspneic on strenuous excercise (0 points)    [] MMRC 1: Dyspneic on walking a slight hill (0 points)    [] MMRC 2: Dyspneic on walking level ground; must stop occasionally due to breathlessness (1 point)    [] MMRC 3: Must stop for breathlessness after walking 100 yards or after a few minutes (2 points)    [] MMRC 4: Cannot leave house; breathless on dressing/undressing (3 points)                          No data to display                          Objective:     Vital Signs (Most Recent)  Vital Signs  Pulse: 85  Resp: 13  SpO2: 98 %  BP: 132/82  Pain Score:   5  Pain Loc: Back  Height and Weight  Height: 6' (182.9 cm)  Weight: 121.4 kg (267 lb 10.2 oz)  BSA (Calculated - sq m): 2.48 sq meters  BMI (Calculated): 36.3  Weight in (lb) to have BMI = 25: 183.9]  Wt Readings from Last 2 Encounters:   01/09/25 121.4 kg (267 lb 10.2 oz)   01/08/25 122.9 kg (270 lb 15.1 oz)       Physical Exam  Vitals and nursing note reviewed.   Constitutional:       Appearance: Normal appearance.      Comments: Malampati score 4    Neck 17"   HENT:      Head: Normocephalic and atraumatic.      Right Ear: Tympanic membrane normal.      Nose: Nose normal.   Eyes:      Pupils: Pupils are equal, round, and reactive to light.   Cardiovascular:      Rate and Rhythm: Normal rate and regular rhythm.      Pulses: Normal pulses.      Heart sounds: Normal heart sounds. " "  Pulmonary:      Effort: Pulmonary effort is normal.      Breath sounds: Normal breath sounds.   Abdominal:      General: Bowel sounds are normal.      Palpations: Abdomen is soft.   Musculoskeletal:         General: Normal range of motion.      Cervical back: Normal range of motion.   Skin:     General: Skin is warm and dry.      Capillary Refill: Capillary refill takes less than 2 seconds.   Neurological:      General: No focal deficit present.      Mental Status: He is alert and oriented to person, place, and time.   Psychiatric:         Mood and Affect: Mood normal.          Laboratory  Lab Results   Component Value Date    WBC 6.22 03/14/2023    RBC 4.93 03/14/2023    HGB 16.4 03/14/2023    HCT 45.8 03/14/2023    MCV 93 03/14/2023    MCH 33.3 (H) 03/14/2023    MCHC 35.8 03/14/2023    RDW 11.8 03/14/2023     03/14/2023    MPV 8.9 (L) 03/14/2023    GRAN 3.4 03/14/2023    GRAN 54.1 03/14/2023    LYMPH 2.1 03/14/2023    LYMPH 33.9 03/14/2023    MONO 0.4 03/14/2023    MONO 6.6 03/14/2023    EOS 0.3 03/14/2023    BASO 0.05 03/14/2023    EOSINOPHIL 4.3 03/14/2023    BASOPHIL 0.8 03/14/2023       BMP  Lab Results   Component Value Date     03/14/2023    K 4.4 03/14/2023     03/14/2023    CO2 25 03/14/2023    BUN 13 03/14/2023    CREATININE 0.9 03/14/2023    CALCIUM 9.4 03/14/2023    ANIONGAP 9 03/14/2023    AST 26 03/14/2023    ALT 37 03/14/2023    PROT 7.5 03/14/2023          No results found for: "IGE"     No results found for: "ASPERGILLUS"  No results found for: "AFUMIGATUSCL"     No results found for: "ACE"     Diagnostic Results:  I have personally reviewed today the following studies:    X-Ray Lumbar Spine 2 Or 3 Views  Narrative: EXAM: XR LUMBAR SPINE 2 OR 3 VIEWS    CLINICAL HISTORY:  [M54.42]-Lumbago with sciatica, left side.    Lumbar spine x-ray, 3 views    COMPARISON: None.    FINDINGS:    Normal alignment and normal lumbar vertebral body heights.  Disc spaces well preserved.  Vertebral " soft tissues demonstrates minimal vascular calcification of aorta.  Impression: Negative lumbar spine exam.    Finalized on: 12/5/2024 5:55 PM By:  Victor M Mccracken MD  [facility]# [id number]      2024-12-05 17:57:[id number]    [facility]       PHYSICIAN INTERPRETATION AND COMMENTS: Findings are consistent with moderate, positional obstructive sleep  apnea(CANDIDA). Please refer to Sleep Disorder Clinic for management.  CLINICAL HISTORY: 44 year old male. BMI 36.33 kg per m2. Snoring and daytime fatigue.  SLEEP STUDY FINDINGS: Patient underwent a 1 night Home Sleep Test and by behavioral criteria, slept for approximately  5.08 hours, with a sleep latency of 6 minutes and a sleep efficiency of 99%. Moderate sleep disordered breathing (AHI=15)  is noted based on a 4% hypopnea desaturation criteria, predominantly in the supine position (24 events/hour). The patient  slept supine 44.7% of the night based on valid recording time of 4.23 hours and is 3 times as likely to have  apneas/hypopneas when supine. When considering more subtle measures of sleep disordered breathing, the overall  respiratory disturbance index is moderate(RDI=29) based on a 1% hypopnea desaturation criteria with confirmation by  surrogate arousal indicators. The apneas/hypopneas are accompanied by minimal oxygen desaturation (percent time  below 90% SpO2: 3%, Min SpO2: 69.5%). The average desaturation across all sleep disordered breathing events is 4.5%.  Snoring occurs for 47.9% (30 dB) of the study, 34.9% is very loud. The mean pulse rate is 69 BPM, with frequent pulse rate  variability (59 events with >= 6 BPM increase/decrease per hour).  TREATMENT CONSIDERATIONS: 1) Auto-CPAP set 4-20 cm H2O with heated humidity and mask/interface fitting. Close  follow up and monitoring is recommended to adjust pressures/masks if necessary 2) Alternate treatment options  including oral appliance therapy (OAT), daytime neuromuscular stimulation Positional therapy, and/or  "surgical  procedures for CANDIDA may be considered based on severity and comorbidities, if PAP is not tolerated or in combination with  PAP 3) Avoid alcohol, sedatives and other CNS depressants that may worsen sleep apnea and disrupt normal sleep  architecture. 4) Sleep hygiene should be reviewed to assess factors that may improve sleep quality. 5) If the patient has a  BMI > 25, weight management and regular exercise should be initiated or continued. 6) Avoid driving and handling  machinery/equipment if sleepy    [place], [place]  [address] [address] [address]/Marie Hinkle NP         The sleep study that you ordered is complete.  You have ordered sleep LAB services to perform the sleep study for Vince Rosas .      Please find Sleep Study result in  the "Media tab" of Chart Review menu.        You can look  for the report in the  Media by the document type "Sleep Study Documents". Alphabetizing  "Document type" column helps to find the SLEEP STUDY report  Faster.       As the ordering provider, you are responsible for reviewing the results and implementing a treatment plan with your patient.    If you need a Sleep Medicine provider to explain the sleep study findings and arrange treatment for the patient, please refer patient for consultation to our Sleep Clinic via [facility] with Ambulatory Consult Sleep.     To do that please place an order for an  "Ambulatory Consult Sleep" -  order , it will go to our clinic work queue for our staff  to contact the patient for an appointment.      For any questions, please contact our sleep lab  staff at [phone] to talk to clinical staff          Niko Vu MD       Assessment/Plan:          1. CANDIDA (obstructive sleep apnea)  Assessment & Plan:  Treatment Options for Sleep Disordered Breathing discussed:     [x]    Continuous Positive Airway Pressure (CPAP).  []    Surgical options  [x]    Oral appliances   []    Behavioral approaches. "   []    Weight loss.   []    Avoiding alcohol and sedative medication.  []    Treat other underlying medical conditions eg. nasal allergies  []     INSPIRE       Orders:  -     Ambulatory referral/consult to Sleep Disorders  -     CPAP FOR HOME USE    2. Essential hypertension    3. BMI 36.0-36.9,adult  Assessment & Plan:  Patient would benefit from weight loss and has tried to set realistic goals to achieve success. Lifestyle changes were discussed on eating healthy, exercising at least 150 minutes weekly, and reducing sedentary behavior.   Discussed the risk factors associated with obesity: Arthritis/CANDIDA/Diabetes/Fatty Liver/Cardiovascular disease/GERD/HTN/HLP.              No follow-ups on file.    This note was prepared using voice recognition system and is likely to have sound alike errors that may have been overlooked even after proof reading.  Please call me with any questions    Discussed diagnosis, its evaluation, treatment and usual course. All questions answered.    The patient was given open opportunity to ask questions and/or express concerns about treatment plan.   All questions/concerns were discussed.       Two patient identifiers used prior to evaluation.     Thank you for the courtesy of participating in the care of this patient    Niko Vu MD      Personal Diagnostic Review  []  CXR    []  ECHO    []  ONSAT    []  6MWD    []  LABS    []  CHEST CT    []  PET CT    []  Biopsy results

## 2025-01-09 NOTE — ASSESSMENT & PLAN NOTE
Treatment Options for Sleep Disordered Breathing discussed:     [x]    Continuous Positive Airway Pressure (CPAP).  []    Surgical options  [x]    Oral appliances   []    Behavioral approaches.   []    Weight loss.   []    Avoiding alcohol and sedative medication.  []    Treat other underlying medical conditions eg. nasal allergies  []     INSPIRE

## 2025-01-09 NOTE — ASSESSMENT & PLAN NOTE
Patient would benefit from weight loss and has tried to set realistic goals to achieve success. Lifestyle changes were discussed on eating healthy, exercising at least 150 minutes weekly, and reducing sedentary behavior.   Discussed the risk factors associated with obesity: Arthritis/CANDIDA/Diabetes/Fatty Liver/Cardiovascular disease/GERD/HTN/HLP.

## 2025-01-13 ENCOUNTER — HOSPITAL ENCOUNTER (OUTPATIENT)
Dept: RADIOLOGY | Facility: HOSPITAL | Age: 45
Discharge: HOME | End: 2025-01-13
Attending: NURSE PRACTITIONER
Payer: COMMERCIAL

## 2025-01-13 DIAGNOSIS — M54.42 ACUTE LEFT-SIDED LOW BACK PAIN WITH LEFT-SIDED SCIATICA: ICD-10-CM

## 2025-01-13 DIAGNOSIS — I10 HYPERTENSION, UNSPECIFIED TYPE: ICD-10-CM

## 2025-01-13 DIAGNOSIS — M54.16 LUMBAR RADICULOPATHY, CHRONIC: ICD-10-CM

## 2025-01-13 PROCEDURE — 72148 MRI LUMBAR SPINE W/O DYE: CPT | Mod: 26,,, | Performed by: STUDENT IN AN ORGANIZED HEALTH CARE EDUCATION/TRAINING PROGRAM

## 2025-01-13 PROCEDURE — 72148 MRI LUMBAR SPINE W/O DYE: CPT | Mod: TC,PO

## 2025-01-13 RX ORDER — NAPROXEN 500 MG/1
500 TABLET ORAL 2 TIMES DAILY PRN
Qty: 40 TABLET | Refills: 5 | Status: SHIPPED | OUTPATIENT
Start: 2025-01-13

## 2025-01-13 RX ORDER — AMLODIPINE BESYLATE 10 MG/1
10 TABLET ORAL DAILY
Qty: 30 TABLET | Refills: 0 | Status: CANCELLED | OUTPATIENT
Start: 2025-01-13

## 2025-01-15 PROBLEM — M51.369 BULGE OF LUMBAR DISC WITHOUT MYELOPATHY: Status: ACTIVE | Noted: 2025-01-15

## 2025-01-15 PROBLEM — I10 HYPERTENSION: Status: RESOLVED | Noted: 2024-12-16 | Resolved: 2025-01-15

## 2025-01-15 RX ORDER — AMLODIPINE BESYLATE 10 MG/1
10 TABLET ORAL DAILY
Qty: 90 TABLET | Refills: 3 | Status: SHIPPED | OUTPATIENT
Start: 2025-01-15

## 2025-01-31 DIAGNOSIS — M54.42 ACUTE LEFT-SIDED LOW BACK PAIN WITH LEFT-SIDED SCIATICA: ICD-10-CM

## 2025-01-31 DIAGNOSIS — M51.369 BULGE OF LUMBAR DISC WITHOUT MYELOPATHY: Primary | ICD-10-CM

## 2025-02-03 ENCOUNTER — TELEPHONE (OUTPATIENT)
Dept: PAIN MEDICINE | Facility: CLINIC | Age: 45
End: 2025-02-03
Payer: COMMERCIAL

## 2025-02-20 DIAGNOSIS — I10 ESSENTIAL HYPERTENSION: Primary | ICD-10-CM

## 2025-02-20 RX ORDER — HYDROCHLOROTHIAZIDE 25 MG/1
25 TABLET ORAL DAILY
Qty: 30 TABLET | Refills: 0 | Status: SHIPPED | OUTPATIENT
Start: 2025-02-20

## 2025-03-05 NOTE — PROGRESS NOTES
New Patient Interventional Pain Note (Initial Visit)    Referring Physician: Marie Hinkle NP    PCP: Marie Hinkle NP    Chief Complaint:   Chief Complaint   Patient presents with    Back Pain        SUBJECTIVE:  Patient arrives 3 minutes late for his clinic visit.    Vince Rosas is a 44 y.o. male with past medical history significant for anxiety/depression, hypertension, obesity, GERD, obstructive sleep apnea who presents to the clinic for the evaluation of lower back and left leg pain.  Patient reports pain began November of 2024 without inciting accident injury or trauma.  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  Today he reports pain which is constant which is rated a 5/10.  Pain at its best is a 5/10 and at its worse is an 8/10.  Pain is described as tingling and pulsing in nature.  Today he reports pain in a bandlike distribution in the lower back which radiates into the left buttock and down the lateral and posterior aspect of the left lower extremity in L4-S1 distribution to the foot.  Pain is exacerbated with prolonged standing, lifting objects greater than 10 lb, sharp turns to the left or when he is attempting to throw something with his right hand in an overhead position.  Pain is improved with physical therapy.  He has been performing twice weekly physical therapy from January 2025 through March 2025 at [facility] in [place].  He is continuing twice weekly sessions.  Patient takes naproxen very judiciously perhaps 1 or 2 tablets every 4 days.  He has not started Robaxin antispasmodics prescribed by PCP.  He also has trialed heating pad, dry needling and TENS stimulation with marginal improvement in his symptoms.  He has not received prior interventional treatment.  He would like to pursue conservative management currently.      Patient denies night fever/night sweats, urinary incontinence, bowel incontinence, significant  weight loss, and significant motor weakness.  Patient reports loss of sensations.      Pain Disability Index Review:         3/6/2025     9:07 AM   Last 3 PDI Scores   Pain Disability Index (PDI) 35       Non-Pharmacologic Treatments:  Physical Therapy/Home Exercise: yes  Ice/Heat:yes  TENS: yes  Acupuncture: no  Massage: no  Chiropractic: yes    Other: no      Pain Medications:  - Adjuvant Medications: Alleve (Naproxen) and Robaxin ( Methocarbamol)    Pain Procedures:   None    Past Medical History:   Diagnosis Date    Anxiety     Depression      History reviewed. No pertinent surgical history.  Review of patient's allergies indicates:  No Known Allergies    Current Outpatient Medications   Medication Sig    hydroCHLOROthiazide (HYDRODIURIL) 25 MG tablet Take 1 tablet (25 mg total) by mouth once daily.    methocarbamoL (ROBAXIN) 500 MG Tab Take 1 tablet (500 mg total) by mouth 3 (three) times daily as needed (muscle pain). May cause drowsiness.    naproxen (NAPROSYN) 500 MG tablet Take 1 tablet (500 mg total) by mouth 2 (two) times daily as needed.    pregabalin (LYRICA) 75 MG capsule Take 1 capsule (75 mg total) by mouth every evening for 10 days, THEN 2 capsules (150 mg total) every evening for 10 days, THEN 3 capsules (225 mg total) every evening for 10 days.     No current facility-administered medications for this visit.         ROS:  GENERAL:  No weight loss, malaise or fevers.  HEENT:   No recent changes in vision or hearing  NECK:  Negative for lumps, no difficulty with swallowing.  RESPIRATORY:  Negative for cough, wheezing or shortness of breath, patient denies any recent URI.  CARDIOVASCULAR:  Negative for chest pain or palpitations.  GI:  Negative for abdominal discomfort, blood in stools or black stools or change in bowel habits.  MUSCULOSKELETAL:  See HPI.  SKIN:  Negative for lesions, rash, and itching.  PSYCH:  No mood disorder or recent psychosocial stressors.   HEMATOLOGY/LYMPHOLOGY:  Negative for  prolonged bleeding, bruising easily or swollen nodes.    NEURO:   No history of syncope, paralysis, seizures or tremors.  All other reviewed and negative other than HPI.    OBJECTIVE:    /89   Pulse 80   Resp 17   Ht 6' (1.829 m)   Wt 125.2 kg (276 lb 0.3 oz)   BMI 37.43 kg/m²       Physical Exam:    GENERAL: Well appearing, in no acute distress, alert and oriented x3.  PSYCH:  Mood and affect appropriate.  SKIN: Skin color, texture, turgor normal, no rashes or lesions.  HEAD/FACE:  Normocephalic, atraumatic. Cranial nerves grossly intact.    CV: RRR with palpation of the radial artery.  PULM: No evidence of respiratory difficulty, symmetric chest rise.  GI:  Soft and non-tender.    BACK: Straight leg raising in the sitting and supine positions is + to radicular pain on L. No pain to palpation over the facet joints of the lumbar spine or spinous processes. Normal range of motion without pain reproduction.  EXTREMITIES: Peripheral joint ROM is full and pain free without obvious instability or laxity in all four extremities. No deformities, edema, or skin discoloration. Good capillary refill.  MUSCULOSKELETAL: Able to stand on heels & toes on R.   hip provocative maneuvers are negative.    SIJ testing: LEFT  - TTP over SI joint: Present  - Damian's/ Tano's: Positive    - Sacroiliac Distraction Test (anterior pressure): Positive  - Sacroiliac Compression Test (lateral pressure): Positive   - SacralThrust Test (posterior pressure): Positive    Facet loading test is negative bilaterally.   Bilateral upper and lower extremity strength is normal and symmetric.  No atrophy or tone abnormalities are noted.    RIGHT Lower extremity: Hip flexion 5/5, Hip Abduction 5/5, Hip Adduction 5/5, Knee extension 5/5, Knee flexion 5/5, Ankle dorsiflexion5/5, Extensor hallucis longus 5/5, Ankle plantarflexion 5/5  LEFT Lower extremity:  Hip flexion 5/5, Hip Abduction 5/5,Hip Adduction 5/5, Knee extension 5/5, Knee flexion  5/5, Ankle dorsiflexion 5/5, Extensor hallucis longus 5/5, Ankle plantarflexion 5/5  -Normal testing knee (patellar) jerk and ankle (achilles) jerk    NEURO: Bilateral upper and lower extremity coordination and muscle stretch reflexes are physiologic and symmetric. No loss of sensation is noted.  GAIT: normal.    Imagin25    MRI Lumbar Spine Without Contrast    Narrative  EXAMINATION:  MRI LUMBAR SPINE WITHOUT CONTRAST    CLINICAL HISTORY:  Lumbar radiculopathy, symptoms persist with conservative treatment; Radiculopathy, lumbar region    TECHNIQUE:  Multiplanar, multisequence MR images were acquired from the thoracolumbar junction to the sacrum without the administration of contrast.    COMPARISON:  X-ray dated 2024    FINDINGS:  There are 5 non-rib-bearing lumbar type vertebrae.  Chronic appearing bilateral L5 pars interarticularis defects noted with associated trace grade 1 anterolisthesis of L5 on S1.  Remaining alignment is unremarkable.  No acute fracture or compression deformity.  No aggressive focal signal abnormality.    Conus medullaris terminates at the L1-L2 level.  Conus medullaris is normal in size and signal.    T12-L1: No significant disc pathology.  No significant spinal canal or neural foraminal stenosis.    L1-L2: No significant disc pathology.  No significant spinal canal or neural foraminal stenosis.    L2-L3: No significant disc pathology.  No significant spinal canal or neural foraminal stenosis.    L3-L4: No significant disc pathology.  No significant spinal canal or neural foraminal stenosis.    L4-L5: No significant disc pathology.  Mild bilateral facet arthropathy.  No significant spinal canal or neural foraminal stenosis.    L5-S1: Disc desiccation and small circumferential disc bulge.  Mild bilateral facet arthropathy.  No significant spinal canal stenosis.  Moderate left and mild right neural foraminal stenosis.    Paraspinal soft tissues are unremarkable.  Visualized  intra-abdominopelvic contents also unremarkable.    Impression  Chronic appearing bilateral L5 pars interarticularis defects with associated trace grade 1 anterolisthesis of L5 on S1.    Lower lumbar level degenerative changes as detailed above.  No significant spinal canal stenosis.  Moderate left and mild right neural foraminal stenosis at L5-S1.        ASSESSMENT: 44 y.o. year old male with     1. Lumbar facet arthropathy        2. Bulge of lumbar disc without myelopathy  Ambulatory referral/consult to Pain Clinic      3. Lumbar radiculopathy  Ambulatory referral/consult to Pain Clinic          PLAN:   - Interventions:  We have discussed considering left-sided SI joint injection to address sacroiliitis versus left-sided L5-S1 and S1 transforaminal epidural steroid injection to address lumbar radiculopathy.  Patient would like to conservatively start with pharmacologic management and continuation of physical therapy. Explained the risks and benefits of the procedure in detail with the patient today in clinic along with alternative treatment options    - Anticoagulation use: No no anticoagulation     report:  Reviewed and consistent with medication use as prescribed.    - Medications:  --I will start the patient on a Lyrica titration to see if this helps with neuropathic pain.  We discussed increasing the dose gradually to reach a therapeutic goal according to the following algorithm.  We discussed potential side effects of this medication which may include drowsiness,dizziness, dry mouth, constipation or peripheral edema.    -Week 1: Take 1 capsule, 75 mg q.h.s.  -Week 2: Take 2 capsules, 150 mg q.h.s.  -Week 3: Take 3 capsules, 225 mg q.h.s.    - Therapy:   We discussed continuing physical therapy to help manage the patient/s painful condition. The patient was counseled that muscle strengthening will improve the long term prognosis in regards to pain and may also help increase range of motion and mobility.   Actively enrolled in twice weekly sessions from 01/2025 03/03/2025 at [facility] in [place].    - Imaging: Reviewed available imaging(MRI lumbar spine) with patient and answered any questions they had regarding study.    - Follow up visit: return to clinic in 6-8 weeks.  Virtual visit with me      The above plan and management options were discussed at length with patient. Patient is in agreement with the above and verbalized understanding.    - I discussed the goals of interventional chronic pain management with the patient on today's visit. We discussed a multimodal and systematic approach to pain.  This includes diagnostic and therapeutic injections, adjuvant pharmacologic treatment, physical therapy, and at times psychiatry.  I emphasized the importance of regular exercise, core strengthening and stretching, diet and weight loss as a cornerstone of long-term pain management.    - This condition does not require this patient to take time off of work, and the primary goal of our Pain Management services is to improve the patient's functional capacity.  - Patient Questions: Answered all of the patient's questions regarding diagnoses, therapy, treatment and next steps        Veronica Shankar MD  Interventional Pain Management  [facility]    Disclaimer:  This note was prepared using voice recognition system and is likely to have sound alike errors that may have been overlooked even after proof reading.  Please call me with any questions

## 2025-03-06 ENCOUNTER — PATIENT MESSAGE (OUTPATIENT)
Dept: ADMINISTRATIVE | Facility: HOSPITAL | Age: 45
End: 2025-03-06
Payer: COMMERCIAL

## 2025-03-06 ENCOUNTER — OFFICE VISIT (OUTPATIENT)
Dept: PAIN MEDICINE | Facility: CLINIC | Age: 45
End: 2025-03-06
Payer: COMMERCIAL

## 2025-03-06 VITALS
SYSTOLIC BLOOD PRESSURE: 138 MMHG | BODY MASS INDEX: 37.38 KG/M2 | RESPIRATION RATE: 17 BRPM | HEIGHT: 72 IN | WEIGHT: 276 LBS | DIASTOLIC BLOOD PRESSURE: 89 MMHG | HEART RATE: 80 BPM

## 2025-03-06 DIAGNOSIS — M54.16 LUMBAR RADICULOPATHY: ICD-10-CM

## 2025-03-06 DIAGNOSIS — M47.816 LUMBAR FACET ARTHROPATHY: Primary | ICD-10-CM

## 2025-03-06 DIAGNOSIS — M51.369 BULGE OF LUMBAR DISC WITHOUT MYELOPATHY: ICD-10-CM

## 2025-03-06 PROCEDURE — 3079F DIAST BP 80-89 MM HG: CPT | Mod: CPTII,S$GLB,, | Performed by: ANESTHESIOLOGY

## 2025-03-06 PROCEDURE — 3008F BODY MASS INDEX DOCD: CPT | Mod: CPTII,S$GLB,, | Performed by: ANESTHESIOLOGY

## 2025-03-06 PROCEDURE — 99204 OFFICE O/P NEW MOD 45 MIN: CPT | Mod: S$GLB,,, | Performed by: ANESTHESIOLOGY

## 2025-03-06 PROCEDURE — 99999 PR PBB SHADOW E&M-EST. PATIENT-LVL IV: CPT | Mod: PBBFAC,,, | Performed by: ANESTHESIOLOGY

## 2025-03-06 PROCEDURE — 3075F SYST BP GE 130 - 139MM HG: CPT | Mod: CPTII,S$GLB,, | Performed by: ANESTHESIOLOGY

## 2025-03-06 PROCEDURE — 1159F MED LIST DOCD IN RCRD: CPT | Mod: CPTII,S$GLB,, | Performed by: ANESTHESIOLOGY

## 2025-03-06 PROCEDURE — 1160F RVW MEDS BY RX/DR IN RCRD: CPT | Mod: CPTII,S$GLB,, | Performed by: ANESTHESIOLOGY

## 2025-03-06 RX ORDER — PREGABALIN 75 MG/1
CAPSULE ORAL
Qty: 60 CAPSULE | Refills: 0 | Status: SHIPPED | OUTPATIENT
Start: 2025-03-06 | End: 2025-04-05

## 2025-03-17 DIAGNOSIS — I10 ESSENTIAL HYPERTENSION: ICD-10-CM

## 2025-03-17 RX ORDER — HYDROCHLOROTHIAZIDE 25 MG/1
25 TABLET ORAL
Qty: 90 TABLET | Refills: 1 | Status: SHIPPED | OUTPATIENT
Start: 2025-03-17

## 2025-03-20 ENCOUNTER — OFFICE VISIT (OUTPATIENT)
Dept: SLEEP MEDICINE | Facility: CLINIC | Age: 45
End: 2025-03-20
Payer: COMMERCIAL

## 2025-03-20 VITALS
BODY MASS INDEX: 22.35 KG/M2 | DIASTOLIC BLOOD PRESSURE: 89 MMHG | HEIGHT: 72 IN | SYSTOLIC BLOOD PRESSURE: 120 MMHG | WEIGHT: 165 LBS

## 2025-03-20 DIAGNOSIS — G47.33 OSA (OBSTRUCTIVE SLEEP APNEA): ICD-10-CM

## 2025-03-20 DIAGNOSIS — I10 ESSENTIAL HYPERTENSION: Primary | ICD-10-CM

## 2025-03-20 RX ORDER — AMLODIPINE BESYLATE 10 MG/1
10 TABLET ORAL
COMMUNITY
Start: 2025-03-07

## 2025-03-20 NOTE — PROGRESS NOTES
The patient location is: [facility]  The chief complaint leading to consultation is:   Chief Complaint   Patient presents with    Apnea        Visit type: audiovisual    Face to Face time with patient: 8 mins  30 minutes of total time spent on the encounter, which includes face to face time and non-face to face time preparing to see the patient (eg, review of tests), Obtaining and/or reviewing separately obtained history, Documenting clinical information in the electronic or other health record, Independently interpreting results (not separately reported) and communicating results to the patient/family/caregiver, or Care coordination (not separately reported).         Each patient to whom he or she provides medical services by telemedicine is:  (1) informed of the relationship between the physician and patient and the respective role of any other health care provider with respect to management of the patient; and (2) notified that he or she may decline to receive medical services by telemedicine and may withdraw from such care at any time.    Notes:                                                Pulmonary Outpatient  Visit     Subjective:       Patient ID: Vince Rosas is a 44 y.o. male.    Social History     Tobacco Use   Smoking Status Former   Smokeless Tobacco Never            Chief Complaint: Apnea          Vince Rosas is 44 y.o.  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 2:00 p.m. goes to 2nd job until 10 pm  Comorbidities hypertension obesity  Sleep latency 5-10 mins  Sleep paralysis  Grind teeth at night  Worry about sleep      03/20/2025  Followup  Doing better  Using CPAP and benefits  No  snoring  Bed time 10-12 MN  Wake time 6 am                  Review of Systems   All other systems reviewed and are negative.      Outpatient Encounter Medications as of 3/20/2025   Medication Sig Dispense Refill    amLODIPine (NORVASC) 10 MG tablet Take 10 mg by mouth.      hydroCHLOROthiazide (HYDRODIURIL) 25 MG tablet TAKE ONE TABLET BY MOUTH EVERY DAY 90 tablet 1    methocarbamoL (ROBAXIN) 500 MG Tab Take 1 tablet (500 mg total) by mouth 3 (three) times daily as needed (muscle pain). May cause drowsiness. 30 tablet 0    naproxen (NAPROSYN) 500 MG tablet Take 1 tablet (500 mg total) by mouth 2 (two) times daily as needed. 40 tablet 5    pregabalin (LYRICA) 75 MG capsule Take 1 capsule (75 mg total) by mouth every evening for 10 days, THEN 2 capsules (150 mg total) every evening for 10 days, THEN 3 capsules (225 mg total) every evening for 10 days. 60 capsule 0    [DISCONTINUED] amLODIPine (NORVASC) 10 MG tablet Take 1 tablet (10 mg total) by mouth once daily. 90 tablet 3    [DISCONTINUED] hydroCHLOROthiazide (HYDRODIURIL) 25 MG tablet Take 1 tablet (25 mg total) by mouth once daily. 30 tablet 0     No facility-administered encounter medications on file as of 3/20/2025.           Pertinent Work Up:      Pulmonary Interventions:      Smoking hx:    Environmental/Occupational hx:        Interval Hx:      Occupational History:         The following portions of the patient's history were reviewed and updated as appropriate: He  has a past medical history of Anxiety and Depression.  He does not have any pertinent problems on file.  He  has no past surgical history on file.  His family history includes COPD in his mother; Parkinsonism in his mother.  He  reports that he has quit smoking. He has never used smokeless tobacco. He reports current alcohol use. He reports that he does not currently use drugs.  He has a current medication list which includes the following prescription(s): amlodipine, hydrochlorothiazide,  methocarbamol, naproxen, and pregabalin.  Current Outpatient Medications on File Prior to Visit   Medication Sig Dispense Refill    amLODIPine (NORVASC) 10 MG tablet Take 10 mg by mouth.      hydroCHLOROthiazide (HYDRODIURIL) 25 MG tablet TAKE ONE TABLET BY MOUTH EVERY DAY 90 tablet 1    methocarbamoL (ROBAXIN) 500 MG Tab Take 1 tablet (500 mg total) by mouth 3 (three) times daily as needed (muscle pain). May cause drowsiness. 30 tablet 0    naproxen (NAPROSYN) 500 MG tablet Take 1 tablet (500 mg total) by mouth 2 (two) times daily as needed. 40 tablet 5    pregabalin (LYRICA) 75 MG capsule Take 1 capsule (75 mg total) by mouth every evening for 10 days, THEN 2 capsules (150 mg total) every evening for 10 days, THEN 3 capsules (225 mg total) every evening for 10 days. 60 capsule 0     No current facility-administered medications on file prior to visit.     He has no known allergies..      BP Readings from Last 3 Encounters:   03/20/25 120/89   03/06/25 138/89   01/09/25 132/82     Snoring / Sleep:     [place] Questionnaire (validated CANDIDA screening questionnaire)    YES -- Snoring/apnea    YES -- Fatigue    Body mass index is 22.38 kg/m².  (>25 is overweight, >30 is obese)    Blood Pressure = Hypertension  (PreHTN 120-139/80-89, Stg1 140-159/90-99, Stg2 >160/>100)  [place]view = 3 of three CANDIDA categories are positive (high risk is 2-3 positive categories)     [place] Sleepiness Scale TOTAL =          3/20/2025   EPWORTH SLEEPINESS SCALE   Sitting and reading 1   Watching TV 3   Sitting, inactive in a public place (e.g. a theatre or a meeting) 0   As a passenger in a car for an hour without a break 0   Lying down to rest in the afternoon when circumstances permit 0   Sitting and talking to someone 1   Sitting quietly after a lunch without alcohol 3   In a car, while stopped for a few minutes in traffic 0   Total score 8      (validated sleepiness questionnaire with a higher score indicating greater sleepiness; range  "0-24)  Failed to redirect to the Timeline version of the [employer] SmartLink.    STOP-Bang Questionnaire (validated CANDIDA screening questionnaire)  Negative unless checked off.  [x] Snoring    [x]  Tired/Fatigued/Sleepy  [x] Obstruction (apneas/choking)  [x] Pressure (HTN)  [x] BMI >35  [] Age >50  [] Neck >40 cm  [x] Gender male   STOP-Bang = 6 (low risk 0-2,high risk 3-8)    Neck circumference 17"        MMRC Dyspnea Scale (4 is worst)     [] MMRC 0: Dyspneic on strenuous excercise (0 points)    [] MMRC 1: Dyspneic on walking a slight hill (0 points)    [] MMRC 2: Dyspneic on walking level ground; must stop occasionally due to breathlessness (1 point)    [] MMRC 3: Must stop for breathlessness after walking 100 yards or after a few minutes (2 points)    [] MMRC 4: Cannot leave house; breathless on dressing/undressing (3 points)                          No data to display                          Objective:     Vital Signs (Most Recent)  Vital Signs  BP: 120/89  Height and Weight  Height: 6' (182.9 cm)  Weight: 74.8 kg (165 lb)  BSA (Calculated - sq m): 1.95 sq meters  BMI (Calculated): 22.4  Weight in (lb) to have BMI = 25: 183.9]  Wt Readings from Last 2 Encounters:   03/20/25 74.8 kg (165 lb)   03/06/25 125.2 kg (276 lb 0.3 oz)       Physical Exam  Vitals and nursing note reviewed.   Constitutional:       Appearance: Normal appearance.      Comments: Malampati score 4    Neck 17"   HENT:      Head: Normocephalic and atraumatic.      Right Ear: Tympanic membrane normal.      Nose: Nose normal.   Eyes:      Pupils: Pupils are equal, round, and reactive to light.   Cardiovascular:      Rate and Rhythm: Normal rate and regular rhythm.      Pulses: Normal pulses.      Heart sounds: Normal heart sounds.   Pulmonary:      Effort: Pulmonary effort is normal.      Breath sounds: Normal breath sounds.   Abdominal:      General: Bowel sounds are normal.      Palpations: Abdomen is soft.   Musculoskeletal:         General: Normal " "range of motion.      Cervical back: Normal range of motion.   Skin:     General: Skin is warm and dry.      Capillary Refill: Capillary refill takes less than 2 seconds.   Neurological:      General: No focal deficit present.      Mental Status: He is alert and oriented to person, place, and time.   Psychiatric:         Mood and Affect: Mood normal.          Laboratory  Lab Results   Component Value Date    WBC 6.22 03/14/2023    RBC 4.93 03/14/2023    HGB 16.4 03/14/2023    HCT 45.8 03/14/2023    MCV 93 03/14/2023    MCH 33.3 (H) 03/14/2023    MCHC 35.8 03/14/2023    RDW 11.8 03/14/2023     03/14/2023    MPV 8.9 (L) 03/14/2023    GRAN 3.4 03/14/2023    GRAN 54.1 03/14/2023    LYMPH 2.1 03/14/2023    LYMPH 33.9 03/14/2023    MONO 0.4 03/14/2023    MONO 6.6 03/14/2023    EOS 0.3 03/14/2023    BASO 0.05 03/14/2023    EOSINOPHIL 4.3 03/14/2023    BASOPHIL 0.8 03/14/2023       BMP  Lab Results   Component Value Date     03/14/2023    K 4.4 03/14/2023     03/14/2023    CO2 25 03/14/2023    BUN 13 03/14/2023    CREATININE 0.9 03/14/2023    CALCIUM 9.4 03/14/2023    ANIONGAP 9 03/14/2023    AST 26 03/14/2023    ALT 37 03/14/2023    PROT 7.5 03/14/2023          No results found for: "IGE"     No results found for: "ASPERGILLUS"  No results found for: "AFUMIGATUSCL"     No results found for: "ACE"     Diagnostic Results:  I have personally reviewed today the following studies:    MRI Lumbar Spine Without Contrast  Narrative: EXAMINATION:  MRI LUMBAR SPINE WITHOUT CONTRAST    CLINICAL HISTORY:  Lumbar radiculopathy, symptoms persist with conservative treatment; Radiculopathy, lumbar region    TECHNIQUE:  Multiplanar, multisequence MR images were acquired from the thoracolumbar junction to the sacrum without the administration of contrast.    COMPARISON:  X-ray dated 12/05/2024    FINDINGS:  There are 5 non-rib-bearing lumbar type vertebrae.  Chronic appearing bilateral L5 pars interarticularis defects " noted with associated trace grade 1 anterolisthesis of L5 on S1.  Remaining alignment is unremarkable.  No acute fracture or compression deformity.  No aggressive focal signal abnormality.    Conus medullaris terminates at the L1-L2 level.  Conus medullaris is normal in size and signal.    T12-L1: No significant disc pathology.  No significant spinal canal or neural foraminal stenosis.    L1-L2: No significant disc pathology.  No significant spinal canal or neural foraminal stenosis.    L2-L3: No significant disc pathology.  No significant spinal canal or neural foraminal stenosis.    L3-L4: No significant disc pathology.  No significant spinal canal or neural foraminal stenosis.    L4-L5: No significant disc pathology.  Mild bilateral facet arthropathy.  No significant spinal canal or neural foraminal stenosis.    L5-S1: Disc desiccation and small circumferential disc bulge.  Mild bilateral facet arthropathy.  No significant spinal canal stenosis.  Moderate left and mild right neural foraminal stenosis.    Paraspinal soft tissues are unremarkable.  Visualized intra-abdominopelvic contents also unremarkable.  Impression: Chronic appearing bilateral L5 pars interarticularis defects with associated trace grade 1 anterolisthesis of L5 on S1.    Lower lumbar level degenerative changes as detailed above.  No significant spinal canal stenosis.  Moderate left and mild right neural foraminal stenosis at L5-S1.    Electronically signed by: Finn Salmeron  Date:    01/13/2025  Time:    09:34       PHYSICIAN INTERPRETATION AND COMMENTS: Findings are consistent with moderate, positional obstructive sleep  apnea(CANDIDA). Please refer to Sleep Disorder Clinic for management.  CLINICAL HISTORY: 44 year old male. BMI 36.33 kg per m2. Snoring and daytime fatigue.  SLEEP STUDY FINDINGS: Patient underwent a 1 night Home Sleep Test and by behavioral criteria, slept for approximately  5.08 hours, with a sleep latency of 6 minutes and a  sleep efficiency of 99%. Moderate sleep disordered breathing (AHI=15)  is noted based on a 4% hypopnea desaturation criteria, predominantly in the supine position (24 events/hour). The patient  slept supine 44.7% of the night based on valid recording time of 4.23 hours and is 3 times as likely to have  apneas/hypopneas when supine. When considering more subtle measures of sleep disordered breathing, the overall  respiratory disturbance index is moderate(RDI=29) based on a 1% hypopnea desaturation criteria with confirmation by  surrogate arousal indicators. The apneas/hypopneas are accompanied by minimal oxygen desaturation (percent time  below 90% SpO2: 3%, Min SpO2: 69.5%). The average desaturation across all sleep disordered breathing events is 4.5%.  Snoring occurs for 47.9% (30 dB) of the study, 34.9% is very loud. The mean pulse rate is 69 BPM, with frequent pulse rate  variability (59 events with >= 6 BPM increase/decrease per hour).  TREATMENT CONSIDERATIONS: 1) Auto-CPAP set 4-20 cm H2O with heated humidity and mask/interface fitting. Close  follow up and monitoring is recommended to adjust pressures/masks if necessary 2) Alternate treatment options  including oral appliance therapy (OAT), daytime neuromuscular stimulation Positional therapy, and/or surgical  procedures for CANDIDA may be considered based on severity and comorbidities, if PAP is not tolerated or in combination with  PAP 3) Avoid alcohol, sedatives and other CNS depressants that may worsen sleep apnea and disrupt normal sleep  architecture. 4) Sleep hygiene should be reviewed to assess factors that may improve sleep quality. 5) If the patient has a  BMI > 25, weight management and regular exercise should be initiated or continued. 6) Avoid driving and handling  machinery/equipment if sleepy    Dear No referring provider defined for this encounter./Marie Hinkle NP         The sleep study that you ordered is complete.  You have ordered  "sleep LAB services to perform the sleep study for Vince Rosas .      Please find Sleep Study result in  the "Media tab" of Chart Review menu.        You can look  for the report in the  Media by the document type "Sleep Study Documents". Alphabetizing  "Document type" column helps to find the SLEEP STUDY report  Faster.       As the ordering provider, you are responsible for reviewing the results and implementing a treatment plan with your patient.    If you need a Sleep Medicine provider to explain the sleep study findings and arrange treatment for the patient, please refer patient for consultation to our Sleep Clinic via [facility] with Ambulatory Consult Sleep.     To do that please place an order for an  "Ambulatory Consult Sleep" -  order , it will go to our clinic work queue for our staff  to contact the patient for an appointment.      For any questions, please contact our sleep lab  staff at [phone] to talk to clinical staff          Niko Vu MD     Vince Rosas  2025 - 2025  Patient ID: [id number]  : 1980  Age: 44 years  Gender: Male  [facility]  [address] [facility], [id number]  Compliance Report  Compliance  Payor Standard  Usage 2025 - 2025  Usage days 30/30 days (100%)  >= 4 hours 29 days (97%)  < 4 hours 1 days (3%)  Usage hours 179 hours 3 minutes  Average usage (total days) 5 hours 58 minutes  Average usage (days used) 5 hours 58 minutes  Median usage (days used) 6 hours 2 minutes  Total used hours (value since last reset - 2025) 363 hours  AirSense 11 AutoSet  Serial number [id number]  Mode AutoSet  Min Pressure 5 cmH2O  Max Pressure 20 cmH2O  EPR Fulltime  EPR level 2  Response Standard  Therapy  Pressure - cmH2O Median: 10.1 95th percentile: 13.5 Maximum: 15.4  Leaks - L/min Median: 0.0 95th percentile: 10.3 Maximum: 23.8  Events per hour AI: 0.3 HI: 0.2 AHI: 0.5  Apnea Index Central: 0.0 Obstructive: 0.2 Unknown: " 0.0  RERA Index 0.1  Cheyne-Darnell respiration (average duration per night) 0 minutes (0%)  Assessment/Plan:          1. Essential hypertension    2. BMI 36.0-36.9,adult    3. CANDIDA (obstructive sleep apnea)  Assessment & Plan:      3/20/2025   EPWORTH SLEEPINESS SCALE   Sitting and reading 1   Watching TV 3   Sitting, inactive in a public place (e.g. a theatre or a meeting) 0   As a passenger in a car for an hour without a break 0   Lying down to rest in the afternoon when circumstances permit 0   Sitting and talking to someone 1   Sitting quietly after a lunch without alcohol 3   In a car, while stopped for a few minutes in traffic 0   Total score 8      Usage > 4 hrs : 97%    APAP 5-20  Data 02/17/2025 to 03/18/2025      USING AND BENEFITS             Follow up in about 1 year (around 3/20/2026), or CPAP download, download.    This note was prepared using voice recognition system and is likely to have sound alike errors that may have been overlooked even after proof reading.  Please call me with any questions    Discussed diagnosis, its evaluation, treatment and usual course. All questions answered.    The patient was given open opportunity to ask questions and/or express concerns about treatment plan.   All questions/concerns were discussed.       Two patient identifiers used prior to evaluation.     Thank you for the courtesy of participating in the care of this patient    Niko Vu MD      Personal Diagnostic Review  []  CXR    []  ECHO    []  ONSAT    []  6MWD    []  LABS    []  CHEST CT    []  PET CT    []  Biopsy results

## 2025-03-20 NOTE — ASSESSMENT & PLAN NOTE
3/20/2025   EPWORTH SLEEPINESS SCALE   Sitting and reading 1   Watching TV 3   Sitting, inactive in a public place (e.g. a theatre or a meeting) 0   As a passenger in a car for an hour without a break 0   Lying down to rest in the afternoon when circumstances permit 0   Sitting and talking to someone 1   Sitting quietly after a lunch without alcohol 3   In a car, while stopped for a few minutes in traffic 0   Total score 8      Usage > 4 hrs : 97%    APAP 5-20  Data 02/17/2025 to 03/18/2025      USING AND BENEFITS

## 2025-04-09 NOTE — PROGRESS NOTES
Established Patient Interventional Pain Note     The patient location is: home  The chief complaint leading to consultation is: LBP, leg pain  Visit type: Virtual visit with synchronous audio and video  Each patient to whom he or she provides medical services by telemedicine is: (1) informed of the relationship between the physician and patient and the respective role of any other health care provider with respect to management of the patient; and (2) notified that he or she may decline to receive medical services by telemedicine and may withdraw from such care at any time.    Referring Physician: No ref. provider found    PCP: Marie Hinkle NP    Chief Complaint:   LBP, leg pain       SUBJECTIVE:  Interval history 04/10/2025  Patient logs in 14 minutes late for her scheduled virtual visit    Today patient presents for follow-up for left-sided SI joint and left leg pain.  Patient has continued twice weekly conventional land-based physical therapy at [facility] from January 2025 through current date 04/10/2025 with noticeable improvement in left-sided radicular symptoms.  He continues to report pain overlying the left SI joint which can then radiate down the lateral and posterior aspect of the left lower extremity in L4-S1 distribution to the foot.  He has continued Lyrica and reached a dosage of 150 mg in the evening with questionable improvement in his pain.  Patient would like to pursue interventional treatment to help with his symptoms.    HPI 03/06/2025    Patient arrives 3 minutes late for his clinic visit.    Vince Rosas is a 44 y.o. male with past medical history significant for anxiety/depression, hypertension, obesity, GERD, obstructive sleep apnea who presents to the clinic for the evaluation of lower back and left leg pain.  Patient reports pain began November of 2024 without inciting accident injury or trauma.  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  Today he reports pain which is constant which is rated a 5/10.  Pain at its best is a 5/10 and at its worse is an 8/10.  Pain is described as tingling and pulsing in nature.  Today he reports pain in a bandlike distribution in the lower back which radiates into the left buttock and down the lateral and posterior aspect of the left lower extremity in L4-S1 distribution to the foot.  Pain is exacerbated with prolonged standing, lifting objects greater than 10 lb, sharp turns to the left or when he is attempting to throw something with his right hand in an overhead position.  Pain is improved with physical therapy.  He has been performing twice weekly physical therapy from January 2025 through March 2025 at [facility] in [place].  He is continuing twice weekly sessions.  Patient takes naproxen very judiciously perhaps 1 or 2 tablets every 4 days.  He has not started Robaxin antispasmodics prescribed by PCP.  He also has trialed heating pad, dry needling and TENS stimulation with marginal improvement in his symptoms.  He has not received prior interventional treatment.  He would like to pursue conservative management currently.      Patient denies night fever/night sweats, urinary incontinence, bowel incontinence, significant weight loss, and significant motor weakness.  Patient reports loss of sensations.      Pain Disability Index Review:         3/6/2025     9:07 AM   Last 3 PDI Scores   Pain Disability Index (PDI) 35       Non-Pharmacologic Treatments:  Physical Therapy/Home Exercise: yes  Ice/Heat:yes  TENS: yes  Acupuncture: no  Massage: no  Chiropractic: yes    Other: no      Pain Medications:  - Adjuvant Medications: Alleve (Naproxen) and Robaxin ( Methocarbamol)    Pain Procedures:   None    Past Medical History:   Diagnosis Date    Anxiety     Depression      History reviewed. No pertinent surgical history.  Review of patient's allergies indicates:  No Known  Allergies    Current Outpatient Medications   Medication Sig    amLODIPine (NORVASC) 10 MG tablet Take 10 mg by mouth.    hydroCHLOROthiazide (HYDRODIURIL) 25 MG tablet TAKE ONE TABLET BY MOUTH EVERY DAY    methocarbamoL (ROBAXIN) 500 MG Tab Take 1 tablet (500 mg total) by mouth 3 (three) times daily as needed (muscle pain). May cause drowsiness.    naproxen (NAPROSYN) 500 MG tablet Take 1 tablet (500 mg total) by mouth 2 (two) times daily as needed.    pregabalin (LYRICA) 225 MG Cap Take 1 capsule (225 mg total) by mouth 2 (two) times daily.     No current facility-administered medications for this visit.         ROS:  GENERAL:  No weight loss, malaise or fevers.  HEENT:   No recent changes in vision or hearing  NECK:  Negative for lumps, no difficulty with swallowing.  RESPIRATORY:  Negative for cough, wheezing or shortness of breath, patient denies any recent URI.  CARDIOVASCULAR:  Negative for chest pain or palpitations.  GI:  Negative for abdominal discomfort, blood in stools or black stools or change in bowel habits.  MUSCULOSKELETAL:  See HPI.  SKIN:  Negative for lesions, rash, and itching.  PSYCH:  No mood disorder or recent psychosocial stressors.   HEMATOLOGY/LYMPHOLOGY:  Negative for prolonged bleeding, bruising easily or swollen nodes.    NEURO:   No history of syncope, paralysis, seizures or tremors.  All other reviewed and negative other than HPI.    OBJECTIVE:    There were no vitals taken for this visit.      Physical Exam:    GENERAL: Well appearing, in no acute distress, alert and oriented x3.  PSYCH:  Mood and affect appropriate.  SKIN: Skin color, texture, turgor normal, no rashes or lesions.  HEAD/FACE:  Normocephalic, atraumatic. Cranial nerves grossly intact.    CV: RRR with palpation of the radial artery.  PULM: No evidence of respiratory difficulty, symmetric chest rise.  GI:  Soft and non-tender.    BACK: Straight leg raising in the sitting and supine positions is + to radicular pain on L.  No pain to palpation over the facet joints of the lumbar spine or spinous processes. Normal range of motion without pain reproduction.  EXTREMITIES: Peripheral joint ROM is full and pain free without obvious instability or laxity in all four extremities. No deformities, edema, or skin discoloration. Good capillary refill.  MUSCULOSKELETAL: Able to stand on heels & toes on R.   hip provocative maneuvers are negative.    SIJ testing: LEFT  - TTP over SI joint: Present  - Damian's/ Tano's: Positive    - Sacroiliac Distraction Test (anterior pressure): Positive  - Sacroiliac Compression Test (lateral pressure): Positive   - SacralThrust Test (posterior pressure): Positive    Facet loading test is negative bilaterally.   Bilateral upper and lower extremity strength is normal and symmetric.  No atrophy or tone abnormalities are noted.    RIGHT Lower extremity: Hip flexion 5/5, Hip Abduction 5/5, Hip Adduction 5/5, Knee extension 5/5, Knee flexion 5/5, Ankle dorsiflexion5/5, Extensor hallucis longus 5/5, Ankle plantarflexion 5/5  LEFT Lower extremity:  Hip flexion 5/5, Hip Abduction 5/5,Hip Adduction 5/5, Knee extension 5/5, Knee flexion 5/5, Ankle dorsiflexion 5/5, Extensor hallucis longus 5/5, Ankle plantarflexion 5/5  -Normal testing knee (patellar) jerk and ankle (achilles) jerk    NEURO: Bilateral upper and lower extremity coordination and muscle stretch reflexes are physiologic and symmetric. No loss of sensation is noted.  GAIT: normal.    Imagin25    MRI Lumbar Spine Without Contrast    Narrative  EXAMINATION:  MRI LUMBAR SPINE WITHOUT CONTRAST    CLINICAL HISTORY:  Lumbar radiculopathy, symptoms persist with conservative treatment; Radiculopathy, lumbar region    TECHNIQUE:  Multiplanar, multisequence MR images were acquired from the thoracolumbar junction to the sacrum without the administration of contrast.    COMPARISON:  X-ray dated 2024    FINDINGS:  There are 5 non-rib-bearing lumbar type  vertebrae.  Chronic appearing bilateral L5 pars interarticularis defects noted with associated trace grade 1 anterolisthesis of L5 on S1.  Remaining alignment is unremarkable.  No acute fracture or compression deformity.  No aggressive focal signal abnormality.    Conus medullaris terminates at the L1-L2 level.  Conus medullaris is normal in size and signal.    T12-L1: No significant disc pathology.  No significant spinal canal or neural foraminal stenosis.    L1-L2: No significant disc pathology.  No significant spinal canal or neural foraminal stenosis.    L2-L3: No significant disc pathology.  No significant spinal canal or neural foraminal stenosis.    L3-L4: No significant disc pathology.  No significant spinal canal or neural foraminal stenosis.    L4-L5: No significant disc pathology.  Mild bilateral facet arthropathy.  No significant spinal canal or neural foraminal stenosis.    L5-S1: Disc desiccation and small circumferential disc bulge.  Mild bilateral facet arthropathy.  No significant spinal canal stenosis.  Moderate left and mild right neural foraminal stenosis.    Paraspinal soft tissues are unremarkable.  Visualized intra-abdominopelvic contents also unremarkable.    Impression  Chronic appearing bilateral L5 pars interarticularis defects with associated trace grade 1 anterolisthesis of L5 on S1.    Lower lumbar level degenerative changes as detailed above.  No significant spinal canal stenosis.  Moderate left and mild right neural foraminal stenosis at L5-S1.        ASSESSMENT: 44 y.o. year old male with     1. Lumbar radiculopathy  Case Request-RAD/Other Procedure Area: Left sided S1 and L5/S1 TF LORENZA    pregabalin (LYRICA) 225 MG Cap      2. Sacroiliitis  Case Request-RAD/Other Procedure Area: Ledt sided Sacroiliac Joint Injection    pregabalin (LYRICA) 225 MG Cap      3. Degeneration of intervertebral disc of lumbar region with discogenic back pain and lower extremity pain              PLAN:   -  Interventions:  Schedule for left-sided SI joint injection to see if this helps with pain secondary to sacroiliitis.  Two weeks later schedule for left-sided L5-S1 and S1 transforaminal epidural steroid injection for lumbar radiculopathy.  Explained the risks and benefits of the procedure in detail with the patient today in clinic along with alternative treatment options.  Patient has elected to pursue these procedures    - Anticoagulation use: No no anticoagulation     report:  Reviewed and consistent with medication use as prescribed.    - Medications:  -Increase Lyrica.  We discussed potential side effects of this medication which may include drowsiness,dizziness, dry mouth, constipation or peripheral edema.  -Lyrica 225 mg BID.    - Therapy:   We discussed continuing physical therapy to help manage the patient/s painful condition. The patient was counseled that muscle strengthening will improve the long term prognosis in regards to pain and may also help increase range of motion and mobility.  Actively enrolled in twice weekly sessions from 01/2025 through current date, 04/07/2025 at [facility] in [place].    - Imaging: Reviewed available imaging(MRI lumbar spine) with patient and answered any questions they had regarding study.    - Follow up visit: return to clinic in 4-6 weeks status post both injections.  Virtual visit      The above plan and management options were discussed at length with patient. Patient is in agreement with the above and verbalized understanding.    - I discussed the goals of interventional chronic pain management with the patient on today's visit. We discussed a multimodal and systematic approach to pain.  This includes diagnostic and therapeutic injections, adjuvant pharmacologic treatment, physical therapy, and at times psychiatry.  I emphasized the importance of regular exercise, core strengthening and stretching, diet and weight loss as a cornerstone of  long-term pain management.    - This condition does not require this patient to take time off of work, and the primary goal of our Pain Management services is to improve the patient's functional capacity.  - Patient Questions: Answered all of the patient's questions regarding diagnoses, therapy, treatment and next steps    Visit today included increased complexity associated with the care of the episodic problem of chronic pain which was addressed and continue to manage the longitudinal care of the patient due to the serious and/or complex managed problem(s) listed above.        Veronica Shankar MD  Interventional Pain Management  [facility]    Disclaimer:  This note was prepared using voice recognition system and is likely to have sound alike errors that may have been overlooked even after proof reading.  Please call me with any questions

## 2025-04-09 NOTE — H&P (VIEW-ONLY)
Established Patient Interventional Pain Note     The patient location is: home  The chief complaint leading to consultation is: LBP, leg pain  Visit type: Virtual visit with synchronous audio and video  Each patient to whom he or she provides medical services by telemedicine is: (1) informed of the relationship between the physician and patient and the respective role of any other health care provider with respect to management of the patient; and (2) notified that he or she may decline to receive medical services by telemedicine and may withdraw from such care at any time.    Referring Physician: No ref. provider found    PCP: Marie Hinkle NP    Chief Complaint:   LBP, leg pain       SUBJECTIVE:  Interval history 04/10/2025  Patient logs in 14 minutes late for her scheduled virtual visit    Today patient presents for follow-up for left-sided SI joint and left leg pain.  Patient has continued twice weekly conventional land-based physical therapy at [facility] from January 2025 through current date 04/10/2025 with noticeable improvement in left-sided radicular symptoms.  He continues to report pain overlying the left SI joint which can then radiate down the lateral and posterior aspect of the left lower extremity in L4-S1 distribution to the foot.  He has continued Lyrica and reached a dosage of 150 mg in the evening with questionable improvement in his pain.  Patient would like to pursue interventional treatment to help with his symptoms.    HPI 03/06/2025    Patient arrives 3 minutes late for his clinic visit.    Vince Rosas is a 44 y.o. male with past medical history significant for anxiety/depression, hypertension, obesity, GERD, obstructive sleep apnea who presents to the clinic for the evaluation of lower back and left leg pain.  Patient reports pain began November of 2024 without inciting accident injury or trauma.  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  Today he reports pain which is constant which is rated a 5/10.  Pain at its best is a 5/10 and at its worse is an 8/10.  Pain is described as tingling and pulsing in nature.  Today he reports pain in a bandlike distribution in the lower back which radiates into the left buttock and down the lateral and posterior aspect of the left lower extremity in L4-S1 distribution to the foot.  Pain is exacerbated with prolonged standing, lifting objects greater than 10 lb, sharp turns to the left or when he is attempting to throw something with his right hand in an overhead position.  Pain is improved with physical therapy.  He has been performing twice weekly physical therapy from January 2025 through March 2025 at [facility] in [place].  He is continuing twice weekly sessions.  Patient takes naproxen very judiciously perhaps 1 or 2 tablets every 4 days.  He has not started Robaxin antispasmodics prescribed by PCP.  He also has trialed heating pad, dry needling and TENS stimulation with marginal improvement in his symptoms.  He has not received prior interventional treatment.  He would like to pursue conservative management currently.      Patient denies night fever/night sweats, urinary incontinence, bowel incontinence, significant weight loss, and significant motor weakness.  Patient reports loss of sensations.      Pain Disability Index Review:         3/6/2025     9:07 AM   Last 3 PDI Scores   Pain Disability Index (PDI) 35       Non-Pharmacologic Treatments:  Physical Therapy/Home Exercise: yes  Ice/Heat:yes  TENS: yes  Acupuncture: no  Massage: no  Chiropractic: yes    Other: no      Pain Medications:  - Adjuvant Medications: Alleve (Naproxen) and Robaxin ( Methocarbamol)    Pain Procedures:   None    Past Medical History:   Diagnosis Date    Anxiety     Depression      History reviewed. No pertinent surgical history.  Review of patient's allergies indicates:  No Known  Allergies    Current Outpatient Medications   Medication Sig    amLODIPine (NORVASC) 10 MG tablet Take 10 mg by mouth.    hydroCHLOROthiazide (HYDRODIURIL) 25 MG tablet TAKE ONE TABLET BY MOUTH EVERY DAY    methocarbamoL (ROBAXIN) 500 MG Tab Take 1 tablet (500 mg total) by mouth 3 (three) times daily as needed (muscle pain). May cause drowsiness.    naproxen (NAPROSYN) 500 MG tablet Take 1 tablet (500 mg total) by mouth 2 (two) times daily as needed.    pregabalin (LYRICA) 225 MG Cap Take 1 capsule (225 mg total) by mouth 2 (two) times daily.     No current facility-administered medications for this visit.         ROS:  GENERAL:  No weight loss, malaise or fevers.  HEENT:   No recent changes in vision or hearing  NECK:  Negative for lumps, no difficulty with swallowing.  RESPIRATORY:  Negative for cough, wheezing or shortness of breath, patient denies any recent URI.  CARDIOVASCULAR:  Negative for chest pain or palpitations.  GI:  Negative for abdominal discomfort, blood in stools or black stools or change in bowel habits.  MUSCULOSKELETAL:  See HPI.  SKIN:  Negative for lesions, rash, and itching.  PSYCH:  No mood disorder or recent psychosocial stressors.   HEMATOLOGY/LYMPHOLOGY:  Negative for prolonged bleeding, bruising easily or swollen nodes.    NEURO:   No history of syncope, paralysis, seizures or tremors.  All other reviewed and negative other than HPI.    OBJECTIVE:    There were no vitals taken for this visit.      Physical Exam:    GENERAL: Well appearing, in no acute distress, alert and oriented x3.  PSYCH:  Mood and affect appropriate.  SKIN: Skin color, texture, turgor normal, no rashes or lesions.  HEAD/FACE:  Normocephalic, atraumatic. Cranial nerves grossly intact.    CV: RRR with palpation of the radial artery.  PULM: No evidence of respiratory difficulty, symmetric chest rise.  GI:  Soft and non-tender.    BACK: Straight leg raising in the sitting and supine positions is + to radicular pain on L.  No pain to palpation over the facet joints of the lumbar spine or spinous processes. Normal range of motion without pain reproduction.  EXTREMITIES: Peripheral joint ROM is full and pain free without obvious instability or laxity in all four extremities. No deformities, edema, or skin discoloration. Good capillary refill.  MUSCULOSKELETAL: Able to stand on heels & toes on R.   hip provocative maneuvers are negative.    SIJ testing: LEFT  - TTP over SI joint: Present  - Damian's/ Tano's: Positive    - Sacroiliac Distraction Test (anterior pressure): Positive  - Sacroiliac Compression Test (lateral pressure): Positive   - SacralThrust Test (posterior pressure): Positive    Facet loading test is negative bilaterally.   Bilateral upper and lower extremity strength is normal and symmetric.  No atrophy or tone abnormalities are noted.    RIGHT Lower extremity: Hip flexion 5/5, Hip Abduction 5/5, Hip Adduction 5/5, Knee extension 5/5, Knee flexion 5/5, Ankle dorsiflexion5/5, Extensor hallucis longus 5/5, Ankle plantarflexion 5/5  LEFT Lower extremity:  Hip flexion 5/5, Hip Abduction 5/5,Hip Adduction 5/5, Knee extension 5/5, Knee flexion 5/5, Ankle dorsiflexion 5/5, Extensor hallucis longus 5/5, Ankle plantarflexion 5/5  -Normal testing knee (patellar) jerk and ankle (achilles) jerk    NEURO: Bilateral upper and lower extremity coordination and muscle stretch reflexes are physiologic and symmetric. No loss of sensation is noted.  GAIT: normal.    Imagin25    MRI Lumbar Spine Without Contrast    Narrative  EXAMINATION:  MRI LUMBAR SPINE WITHOUT CONTRAST    CLINICAL HISTORY:  Lumbar radiculopathy, symptoms persist with conservative treatment; Radiculopathy, lumbar region    TECHNIQUE:  Multiplanar, multisequence MR images were acquired from the thoracolumbar junction to the sacrum without the administration of contrast.    COMPARISON:  X-ray dated 2024    FINDINGS:  There are 5 non-rib-bearing lumbar type  vertebrae.  Chronic appearing bilateral L5 pars interarticularis defects noted with associated trace grade 1 anterolisthesis of L5 on S1.  Remaining alignment is unremarkable.  No acute fracture or compression deformity.  No aggressive focal signal abnormality.    Conus medullaris terminates at the L1-L2 level.  Conus medullaris is normal in size and signal.    T12-L1: No significant disc pathology.  No significant spinal canal or neural foraminal stenosis.    L1-L2: No significant disc pathology.  No significant spinal canal or neural foraminal stenosis.    L2-L3: No significant disc pathology.  No significant spinal canal or neural foraminal stenosis.    L3-L4: No significant disc pathology.  No significant spinal canal or neural foraminal stenosis.    L4-L5: No significant disc pathology.  Mild bilateral facet arthropathy.  No significant spinal canal or neural foraminal stenosis.    L5-S1: Disc desiccation and small circumferential disc bulge.  Mild bilateral facet arthropathy.  No significant spinal canal stenosis.  Moderate left and mild right neural foraminal stenosis.    Paraspinal soft tissues are unremarkable.  Visualized intra-abdominopelvic contents also unremarkable.    Impression  Chronic appearing bilateral L5 pars interarticularis defects with associated trace grade 1 anterolisthesis of L5 on S1.    Lower lumbar level degenerative changes as detailed above.  No significant spinal canal stenosis.  Moderate left and mild right neural foraminal stenosis at L5-S1.        ASSESSMENT: 44 y.o. year old male with     1. Lumbar radiculopathy  Case Request-RAD/Other Procedure Area: Left sided S1 and L5/S1 TF LORENZA    pregabalin (LYRICA) 225 MG Cap      2. Sacroiliitis  Case Request-RAD/Other Procedure Area: Ledt sided Sacroiliac Joint Injection    pregabalin (LYRICA) 225 MG Cap      3. Degeneration of intervertebral disc of lumbar region with discogenic back pain and lower extremity pain              PLAN:   -  Interventions:  Schedule for left-sided SI joint injection to see if this helps with pain secondary to sacroiliitis.  Two weeks later schedule for left-sided L5-S1 and S1 transforaminal epidural steroid injection for lumbar radiculopathy.  Explained the risks and benefits of the procedure in detail with the patient today in clinic along with alternative treatment options.  Patient has elected to pursue these procedures    - Anticoagulation use: No no anticoagulation     report:  Reviewed and consistent with medication use as prescribed.    - Medications:  -Increase Lyrica.  We discussed potential side effects of this medication which may include drowsiness,dizziness, dry mouth, constipation or peripheral edema.  -Lyrica 225 mg BID.    - Therapy:   We discussed continuing physical therapy to help manage the patient/s painful condition. The patient was counseled that muscle strengthening will improve the long term prognosis in regards to pain and may also help increase range of motion and mobility.  Actively enrolled in twice weekly sessions from 01/2025 through current date, 04/07/2025 at [facility] in [place].    - Imaging: Reviewed available imaging(MRI lumbar spine) with patient and answered any questions they had regarding study.    - Follow up visit: return to clinic in 4-6 weeks status post both injections.  Virtual visit      The above plan and management options were discussed at length with patient. Patient is in agreement with the above and verbalized understanding.    - I discussed the goals of interventional chronic pain management with the patient on today's visit. We discussed a multimodal and systematic approach to pain.  This includes diagnostic and therapeutic injections, adjuvant pharmacologic treatment, physical therapy, and at times psychiatry.  I emphasized the importance of regular exercise, core strengthening and stretching, diet and weight loss as a cornerstone of  long-term pain management.    - This condition does not require this patient to take time off of work, and the primary goal of our Pain Management services is to improve the patient's functional capacity.  - Patient Questions: Answered all of the patient's questions regarding diagnoses, therapy, treatment and next steps    Visit today included increased complexity associated with the care of the episodic problem of chronic pain which was addressed and continue to manage the longitudinal care of the patient due to the serious and/or complex managed problem(s) listed above.        Veronica Shankar MD  Interventional Pain Management  [facility]    Disclaimer:  This note was prepared using voice recognition system and is likely to have sound alike errors that may have been overlooked even after proof reading.  Please call me with any questions

## 2025-04-10 ENCOUNTER — TELEPHONE (OUTPATIENT)
Dept: PAIN MEDICINE | Facility: CLINIC | Age: 45
End: 2025-04-10

## 2025-04-10 ENCOUNTER — OFFICE VISIT (OUTPATIENT)
Dept: PAIN MEDICINE | Facility: CLINIC | Age: 45
End: 2025-04-10
Payer: COMMERCIAL

## 2025-04-10 DIAGNOSIS — M54.16 LUMBAR RADICULOPATHY: Primary | ICD-10-CM

## 2025-04-10 DIAGNOSIS — M46.1 SACROILIITIS: ICD-10-CM

## 2025-04-10 DIAGNOSIS — M51.362 DEGENERATION OF INTERVERTEBRAL DISC OF LUMBAR REGION WITH DISCOGENIC BACK PAIN AND LOWER EXTREMITY PAIN: ICD-10-CM

## 2025-04-10 PROCEDURE — 1159F MED LIST DOCD IN RCRD: CPT | Mod: CPTII,95,, | Performed by: ANESTHESIOLOGY

## 2025-04-10 PROCEDURE — 98006 SYNCH AUDIO-VIDEO EST MOD 30: CPT | Mod: 95,,, | Performed by: ANESTHESIOLOGY

## 2025-04-10 PROCEDURE — G2211 COMPLEX E/M VISIT ADD ON: HCPCS | Mod: 95,,, | Performed by: ANESTHESIOLOGY

## 2025-04-10 PROCEDURE — 1160F RVW MEDS BY RX/DR IN RCRD: CPT | Mod: CPTII,95,, | Performed by: ANESTHESIOLOGY

## 2025-04-10 RX ORDER — PREGABALIN 225 MG/1
225 CAPSULE ORAL 2 TIMES DAILY
Qty: 180 CAPSULE | Refills: 0 | Status: SHIPPED | OUTPATIENT
Start: 2025-04-10 | End: 2025-07-09

## 2025-04-16 ENCOUNTER — TELEPHONE (OUTPATIENT)
Dept: INTERNAL MEDICINE | Facility: CLINIC | Age: 45
End: 2025-04-16
Payer: COMMERCIAL

## 2025-04-16 NOTE — TELEPHONE ENCOUNTER
Returned call to [facility] in regards to when can patient returned to work, if so does he have any restriction and what are the restriction. What are the treatment plan ?

## 2025-04-16 NOTE — TELEPHONE ENCOUNTER
----- Message from Nurse Vince sent at 4/16/2025  8:15 AM CDT -----  met marcos orantes (Today,  8:06 AM)Type:  Needs Medical AdviceWho Called: reena Campuzano (please be specific):  How long has patient had these symptoms:  Pharmacy name and phone #:  Would the patient rather a call back or a response via [facility]? Call [facility] Call Back Number: [phone] fax [phone] Information: requesting clinical notes form recent visitClaim number :[id number]

## 2025-04-17 DIAGNOSIS — E66.01 SEVERE OBESITY WITH BODY MASS INDEX (BMI) OF 36.0 TO 36.9 WITH SERIOUS COMORBIDITY: Primary | ICD-10-CM

## 2025-04-23 NOTE — PRE-PROCEDURE INSTRUCTIONS
Spoke with patient regarding procedure scheduled on 5.2     Arrival time 0900     Has patient been sick with fever or on antibiotics within the last 7 days? No     Does the patient have any open wounds, sores or rashes? No     Does the patient have any recent fractures? no     Has patient received a vaccination within the last 7 days? No     Received the COVID vaccination? yes     Has the patient stopped all medications as directed? na     Does patient have a pacemaker, defibrillator, or implantable stimulator? No     Does the patient have a ride to and from procedure and someone reliable to remain with patient?  WIFE     Is the patient diabetic? no      Does the patient have sleep apnea? Or use O2 at home? CANDIDA     Is the patient receiving sedation? Yes      Is the patient instructed to remain NPO beginning at midnight the night before their procedure? yes     Procedure location confirmed with patient? Yes     Covid- Denies signs/symptoms. Instructed to notify PAT/MD if any changes.

## 2025-05-02 ENCOUNTER — HOSPITAL ENCOUNTER (OUTPATIENT)
Facility: HOSPITAL | Age: 45
Discharge: HOME | End: 2025-05-02
Attending: ANESTHESIOLOGY | Admitting: ANESTHESIOLOGY
Payer: COMMERCIAL

## 2025-05-02 VITALS
TEMPERATURE: 98 F | OXYGEN SATURATION: 97 % | HEART RATE: 71 BPM | DIASTOLIC BLOOD PRESSURE: 95 MMHG | RESPIRATION RATE: 15 BRPM | SYSTOLIC BLOOD PRESSURE: 140 MMHG | WEIGHT: 268.75 LBS | HEIGHT: 72 IN | BODY MASS INDEX: 36.4 KG/M2

## 2025-05-02 DIAGNOSIS — M46.1 SACROILIITIS: ICD-10-CM

## 2025-05-02 PROCEDURE — 63600175 PHARM REV CODE 636 W HCPCS: Performed by: ANESTHESIOLOGY

## 2025-05-02 PROCEDURE — 25000003 PHARM REV CODE 250: Performed by: ANESTHESIOLOGY

## 2025-05-02 PROCEDURE — 27096 INJECT SACROILIAC JOINT: CPT | Mod: LT,,, | Performed by: ANESTHESIOLOGY

## 2025-05-02 PROCEDURE — 27096 INJECT SACROILIAC JOINT: CPT | Mod: LT | Performed by: ANESTHESIOLOGY

## 2025-05-02 PROCEDURE — 25500020 PHARM REV CODE 255: Performed by: ANESTHESIOLOGY

## 2025-05-02 RX ORDER — BUPIVACAINE HYDROCHLORIDE 2.5 MG/ML
INJECTION, SOLUTION EPIDURAL; INFILTRATION; INTRACAUDAL; PERINEURAL
Status: DISCONTINUED | OUTPATIENT
Start: 2025-05-02 | End: 2025-05-02 | Stop reason: HOSPADM

## 2025-05-02 RX ORDER — MIDAZOLAM HYDROCHLORIDE 1 MG/ML
INJECTION, SOLUTION INTRAMUSCULAR; INTRAVENOUS
Status: DISCONTINUED | OUTPATIENT
Start: 2025-05-02 | End: 2025-05-02 | Stop reason: HOSPADM

## 2025-05-02 RX ORDER — INDOMETHACIN 25 MG/1
CAPSULE ORAL
Status: DISCONTINUED | OUTPATIENT
Start: 2025-05-02 | End: 2025-05-02 | Stop reason: HOSPADM

## 2025-05-02 RX ORDER — FENTANYL CITRATE 50 UG/ML
INJECTION, SOLUTION INTRAMUSCULAR; INTRAVENOUS
Status: DISCONTINUED | OUTPATIENT
Start: 2025-05-02 | End: 2025-05-02 | Stop reason: HOSPADM

## 2025-05-02 RX ORDER — TRIAMCINOLONE ACETONIDE 40 MG/ML
INJECTION, SUSPENSION INTRA-ARTICULAR; INTRAMUSCULAR
Status: DISCONTINUED | OUTPATIENT
Start: 2025-05-02 | End: 2025-05-02 | Stop reason: HOSPADM

## 2025-05-02 NOTE — DISCHARGE INSTRUCTIONS

## 2025-05-02 NOTE — OP NOTE
Vince Rosas  44 y.o. male      Vitals:    05/02/25 0900   BP: (!) 148/98   Pulse:    Resp:    Temp:        Procedure Date:05/02/2025        INFORMED CONSENT: The procedure, risks, benefits and options were discussed with patient. There are no contraindications to the procedure. The patient expressed understanding and agreed to proceed. The personnel performing the procedure was discussed. I verify that I personally obtained consent prior to the start of the procedure and the signed consent can be found on the patient's chart.       Anesthesia:   Conscious sedation provided by M.D    The patient was monitored with continuous pulse oximetry, EKG, and intermittent blood pressure monitors.  The patient was hemodynamically stable throughout the entire process was responsive to voice, and breathing spontaneously.  Supplemental O2 was provided at 2L/min via nasal cannula.  Patient was comfortable for the duration of the procedure. (See nurse documentation and case log for sedation time)    There was a total of 2mg IV Midazolam and 50mcg Fentanyl titrated for the procedure    Pre Procedure diagnosis: Sacroiliitis [M46.1]  Post-Procedure diagnosis: SAME      PROCEDURE:    Left sacroiliac joint injection                            REASON FOR PROCEDURE:   Sacroiliitis [M46.1]        MEDICATIONS INJECTED: 1mL 40mg/ml Kenalog and 4mL Bupivacaine 0.25% into each site    LOCAL ANESTHETIC USED: Xylocaine 1% 6ml     ESTIMATED BLOOD LOSS: None.   COMPLICATIONS: None.     TECHNIQUE:    Sacroiliac joint injection:   Laying in the prone position, the patient was prepped and draped in the usual sterile fashion using ChloraPrep and fenestrated drape.  The area was determined under fluoroscopy.  Local Xylocaine was injected by raising a wheel and going down to the periosteum using a 27-gauge hypodermic needle.  The 3.5 inch 22-gauge spinal needle was introduce into the Left sacroiliac joint.  Negative pressure applied to confirm no  intravascular placement.  Omnipaque was injected to confirm placement and to confirm that there was no vascular runoff.  The medication was then injected slowly.  The patient tolerated the procedure well.                       The patient was monitored for approximately 30 minutes after the procedure. Patient was given post procedure and discharge instructions to follow at home. We will see the patient back in two weeks or the patient may call to inform of status. The patient was discharged in a stable condition

## 2025-05-02 NOTE — DISCHARGE SUMMARY
Discharge Note  Short Stay      SUMMARY     Admit Date: 5/2/2025    Attending Physician: Veronica Shankar MD        Discharge Physician: Veronica Shankar MD        Discharge Date: 5/2/2025 9:54 AM    Procedure(s) (LRB):  Ledt sided Sacroiliac Joint Injection (Left)    Final Diagnosis: Sacroiliitis [M46.1]    Disposition: Home or self care    Patient Instructions:   Current Discharge Medication List        CONTINUE these medications which have NOT CHANGED    Details   hydroCHLOROthiazide (HYDRODIURIL) 25 MG tablet TAKE ONE TABLET BY MOUTH EVERY DAY  Qty: 90 tablet, Refills: 1    Comments: .  Associated Diagnoses: Essential hypertension      pregabalin (LYRICA) 225 MG Cap Take 1 capsule (225 mg total) by mouth 2 (two) times daily.  Qty: 180 capsule, Refills: 0    Associated Diagnoses: Lumbar radiculopathy; Sacroiliitis      amLODIPine (NORVASC) 10 MG tablet Take 10 mg by mouth.      methocarbamoL (ROBAXIN) 500 MG Tab Take 1 tablet (500 mg total) by mouth 3 (three) times daily as needed (muscle pain). May cause drowsiness.  Qty: 30 tablet, Refills: 0    Associated Diagnoses: Acute left-sided low back pain with left-sided sciatica      naproxen (NAPROSYN) 500 MG tablet Take 1 tablet (500 mg total) by mouth 2 (two) times daily as needed.  Qty: 40 tablet, Refills: 5    Associated Diagnoses: Acute left-sided low back pain with left-sided sciatica      semaglutide, weight loss, 0.25 mg/0.5 mL PnIj Inject 0.25 mg into the skin every 7 days.  Qty: 4 mL, Refills: 0    Associated Diagnoses: Severe obesity with body mass index (BMI) of 36.0 to 36.9 with serious comorbidity                 Discharge Diagnosis: Sacroiliitis [M46.1]  Condition on Discharge: Stable with no complications to procedure   Diet on Discharge: Same as before.  Activity: as per instruction sheet.  Discharge to: Home with a responsible adult.  Follow up: 2-4 weeks       Please call the office at [phone] if you experience any weakness or loss of sensation,  fever > 101.5, pain uncontrolled with oral medications, persistent nausea/vomiting/or diarrhea, redness or drainage from the incisions, or any other worrisome concerns. If physician on call was not reached or could not communicate with our office for any reason please go to the nearest emergency department

## 2025-05-08 NOTE — PRE-PROCEDURE INSTRUCTIONS
Spoke with patient regarding procedure scheduled on 5.16     Arrival time 0945     Has patient been sick with fever or on antibiotics within the last 7 days? No     Does the patient have any open wounds, sores or rashes? No     Does the patient have any recent fractures? no     Has patient received a vaccination within the last 7 days? No     Received the COVID vaccination?      Has the patient stopped all medications as directed? na     Does patient have a pacemaker, defibrillator, or implantable stimulator? No     Does the patient have a ride to and from procedure and someone reliable to remain with patient?  wife     Is the patient diabetic? no      Does the patient have sleep apnea? Or use O2 at home? monica cpap     Is the patient receiving sedation?       Is the patient instructed to remain NPO beginning at midnight the night before their procedure? yes     Procedure location confirmed with patient? Yes     Covid- Denies signs/symptoms. Instructed to notify PAT/MD if any changes.

## 2025-05-23 ENCOUNTER — HOSPITAL ENCOUNTER (OUTPATIENT)
Facility: HOSPITAL | Age: 45
Discharge: HOME | End: 2025-05-23
Attending: ANESTHESIOLOGY | Admitting: ANESTHESIOLOGY
Payer: COMMERCIAL

## 2025-05-23 VITALS
DIASTOLIC BLOOD PRESSURE: 72 MMHG | RESPIRATION RATE: 19 BRPM | SYSTOLIC BLOOD PRESSURE: 118 MMHG | WEIGHT: 264 LBS | TEMPERATURE: 98 F | HEART RATE: 82 BPM | OXYGEN SATURATION: 95 % | HEIGHT: 72 IN | BODY MASS INDEX: 35.76 KG/M2

## 2025-05-23 DIAGNOSIS — M54.16 LUMBAR RADICULOPATHY: ICD-10-CM

## 2025-05-23 PROCEDURE — 64484 NJX AA&/STRD TFRM EPI L/S EA: CPT | Mod: LT,,, | Performed by: ANESTHESIOLOGY

## 2025-05-23 PROCEDURE — 63600175 PHARM REV CODE 636 W HCPCS: Performed by: ANESTHESIOLOGY

## 2025-05-23 PROCEDURE — 64483 NJX AA&/STRD TFRM EPI L/S 1: CPT | Mod: LT,,, | Performed by: ANESTHESIOLOGY

## 2025-05-23 PROCEDURE — 64484 NJX AA&/STRD TFRM EPI L/S EA: CPT | Mod: LT | Performed by: ANESTHESIOLOGY

## 2025-05-23 PROCEDURE — 25500020 PHARM REV CODE 255: Performed by: ANESTHESIOLOGY

## 2025-05-23 PROCEDURE — 25000003 PHARM REV CODE 250: Performed by: ANESTHESIOLOGY

## 2025-05-23 PROCEDURE — 64483 NJX AA&/STRD TFRM EPI L/S 1: CPT | Mod: LT | Performed by: ANESTHESIOLOGY

## 2025-05-23 RX ORDER — MIDAZOLAM HYDROCHLORIDE 1 MG/ML
INJECTION, SOLUTION INTRAMUSCULAR; INTRAVENOUS
Status: DISCONTINUED | OUTPATIENT
Start: 2025-05-23 | End: 2025-05-23 | Stop reason: HOSPADM

## 2025-05-23 RX ORDER — FENTANYL CITRATE 50 UG/ML
INJECTION, SOLUTION INTRAMUSCULAR; INTRAVENOUS
Status: DISCONTINUED | OUTPATIENT
Start: 2025-05-23 | End: 2025-05-23 | Stop reason: HOSPADM

## 2025-05-23 RX ORDER — SODIUM BICARBONATE 1 MEQ/ML
SYRINGE (ML) INTRAVENOUS
Status: DISCONTINUED | OUTPATIENT
Start: 2025-05-23 | End: 2025-05-23 | Stop reason: HOSPADM

## 2025-05-23 RX ORDER — DEXAMETHASONE SODIUM PHOSPHATE 10 MG/ML
INJECTION, SOLUTION INTRA-ARTICULAR; INTRALESIONAL; INTRAMUSCULAR; INTRAVENOUS; SOFT TISSUE
Status: DISCONTINUED | OUTPATIENT
Start: 2025-05-23 | End: 2025-05-23 | Stop reason: HOSPADM

## 2025-05-23 RX ORDER — BUPIVACAINE HYDROCHLORIDE 2.5 MG/ML
INJECTION, SOLUTION EPIDURAL; INFILTRATION; INTRACAUDAL; PERINEURAL
Status: DISCONTINUED | OUTPATIENT
Start: 2025-05-23 | End: 2025-05-23 | Stop reason: HOSPADM

## 2025-05-23 NOTE — DISCHARGE INSTRUCTIONS

## 2025-05-23 NOTE — DISCHARGE SUMMARY
Discharge Note  Short Stay      SUMMARY     Admit Date: 5/23/2025    Attending Physician: Veronica Shankar MD        Discharge Physician: Veronica Shankar MD        Discharge Date: 5/23/2025 8:31 AM    Procedure(s) (LRB):  Left sided S1 and L5/S1 TF LORENZA (Left)    Final Diagnosis: Lumbar radiculopathy [M54.16]    Disposition: Home or self care    Patient Instructions:   Current Discharge Medication List        CONTINUE these medications which have NOT CHANGED    Details   hydroCHLOROthiazide (HYDRODIURIL) 25 MG tablet TAKE ONE TABLET BY MOUTH EVERY DAY  Qty: 90 tablet, Refills: 1    Comments: .  Associated Diagnoses: Essential hypertension      semaglutide, weight loss, 0.25 mg/0.5 mL PnIj Inject 0.25 mg into the skin every 7 days.  Qty: 4 mL, Refills: 0    Associated Diagnoses: Severe obesity with body mass index (BMI) of 36.0 to 36.9 with serious comorbidity      amLODIPine (NORVASC) 10 MG tablet Take 10 mg by mouth.      methocarbamoL (ROBAXIN) 500 MG Tab Take 1 tablet (500 mg total) by mouth 3 (three) times daily as needed (muscle pain). May cause drowsiness.  Qty: 30 tablet, Refills: 0    Associated Diagnoses: Acute left-sided low back pain with left-sided sciatica      naproxen (NAPROSYN) 500 MG tablet Take 1 tablet (500 mg total) by mouth 2 (two) times daily as needed.  Qty: 40 tablet, Refills: 5    Associated Diagnoses: Acute left-sided low back pain with left-sided sciatica      pregabalin (LYRICA) 225 MG Cap Take 1 capsule (225 mg total) by mouth 2 (two) times daily.  Qty: 180 capsule, Refills: 0    Associated Diagnoses: Lumbar radiculopathy; Sacroiliitis                 Discharge Diagnosis: Lumbar radiculopathy [M54.16]  Condition on Discharge: Stable with no complications to procedure   Diet on Discharge: Same as before.  Activity: as per instruction sheet.  Discharge to: Home with a responsible adult.  Follow up: 2-4 weeks       Please call the office at [phone] if you experience any weakness or loss of  sensation, fever > 101.5, pain uncontrolled with oral medications, persistent nausea/vomiting/or diarrhea, redness or drainage from the incisions, or any other worrisome concerns. If physician on call was not reached or could not communicate with our office for any reason please go to the nearest emergency department

## 2025-05-23 NOTE — OP NOTE
Vince Rosas  44 y.o. male      Vitals:    05/23/25 0820   BP: 134/68   Pulse: 64   Resp: 16   Temp:      Procedure Date:05/23/2025      INFORMED CONSENT: The procedure, risks, benefits and options were discussed with patient. There are no contraindications to the procedure. The patient expressed understanding and agreed to proceed. The personnel performing the procedure was discussed. I verify that I personally obtained consent prior to the start of the procedure and the signed consent can be found on the patient's chart.       Anesthesia:   Conscious sedation provided by M.D    The patient was monitored with continuous pulse oximetry, EKG, and intermittent blood pressure monitors.  The patient was hemodynamically stable throughout the entire process was responsive to voice, and breathing spontaneously.  Supplemental O2 was provided at 2L/min via nasal cannula.  Patient was comfortable for the duration of the procedure. (See nurse documentation and case log for sedation time)    There was a total of 2mg IV Midazolam and 75mcg Fentanyl titrated for the procedure    Pre Procedure diagnosis: Lumbar radiculopathy [M54.16]  Post-Procedure diagnosis: SAME     Complications: None    Specimens: None      DESCRIPTION OF PROCEDURE: The patient was brought to the procedure room. IV access was obtained prior to the procedure. The patient was positioned prone on the fluoroscopy table. Continuous hemodynamic monitoring was initiated including blood pressure, EKG, and pulse oximetry. . The skin was prepped with chlorhexidine and draped in a sterile fashion.      The  L5/S1 and  S1 transforaminal spaces were identified with fluoroscopy in the  AP, oblique, and lateral views.  A 22 gauge spinal quinke needle was then advanced into the area of the trans foraminal spaces left with confirmation of proper needle position using AP, oblique, and lateral fluoroscopic views. Once the needle tip was in the area of the transforaminal space,  and there was no blood, CSF or paraesthesias,  1.5 mL of Omnipaque 300mg/ml was injected on left for a total of 3mL.  Fluoroscopic imaging in the AP and lateral views revealed a clear outline of the spinal nerve with proximal spread of agent through the neural foramen into the epidural space. A total combination of 2mL of Bupivacaine and 10 mg dexamethasone was injected on each side and at each level with displacement of the contrast dye confirming that the medication went into the area of the transforaminal spaces left. A sterile bandaid was applied.   Patient tolerated the procedure well.    Patient was taken back to the recovery room for further observation.     The patient was discharged to home in stable condition

## 2025-05-23 NOTE — H&P
HPI  Patient presenting for Procedure(s) (LRB):  Left sided S1 and L5/S1 TF LORENZA (Left)     Patient on Anti-coagulation No    No health changes since previous encounter    Past Medical History:   Diagnosis Date    Anxiety     Depression      Past Surgical History:   Procedure Laterality Date    INJECTION OF ANESTHETIC AGENT INTO SACROILIAC JOINT Left 5/2/2025    Procedure: Ledt sided Sacroiliac Joint Injection;  Surgeon: Veronica Shankar MD;  Location: [facility];  Service: Pain Management;  Laterality: Left;     Review of patient's allergies indicates:  No Known Allergies     Medications Ordered Prior to Encounter[1]     PMHx, PSHx, Allergies, Medications reviewed in epic    ROS negative except pain complaints in HPI    OBJECTIVE:    BP (!) 154/99 (BP Location: Right arm, Patient Position: Sitting)   Pulse 71   Temp 97.8 °F (36.6 °C) (Temporal)   Resp 16   Ht 6' (1.829 m)   Wt 119.7 kg (264 lb)   SpO2 (!) 94%   BMI 35.80 kg/m²     PHYSICAL EXAMINATION:    GENERAL: Well appearing, in no acute distress, alert and oriented x3.  PSYCH:  Mood and affect appropriate.  SKIN: Skin color, texture, turgor normal, no rashes or lesions which will impact the procedure.  CV: RRR with palpation of the radial artery.  PULM: No evidence of respiratory difficulty, symmetric chest rise. Clear to auscultation.  NEURO: Cranial nerves grossly intact.    Plan:    Proceed with procedure as planned Procedure(s) (LRB):  Left sided S1 and L5/S1 TF LORENZA (Left)    Veronica Shankar MD  05/23/2025                 [1]   No current facility-administered medications on file prior to encounter.     Current Outpatient Medications on File Prior to Encounter   Medication Sig Dispense Refill    hydroCHLOROthiazide (HYDRODIURIL) 25 MG tablet TAKE ONE TABLET BY MOUTH EVERY DAY 90 tablet 1    amLODIPine (NORVASC) 10 MG tablet Take 10 mg by mouth.      methocarbamoL (ROBAXIN) 500 MG Tab Take 1 tablet (500 mg total) by mouth 3 (three) times daily as  needed (muscle pain). May cause drowsiness. 30 tablet 0    naproxen (NAPROSYN) 500 MG tablet Take 1 tablet (500 mg total) by mouth 2 (two) times daily as needed. 40 tablet 5    pregabalin (LYRICA) 225 MG Cap Take 1 capsule (225 mg total) by mouth 2 (two) times daily. 180 capsule 0

## 2025-06-11 DIAGNOSIS — E66.01 SEVERE OBESITY WITH BODY MASS INDEX (BMI) OF 36.0 TO 36.9 WITH SERIOUS COMORBIDITY: Primary | ICD-10-CM

## 2025-06-11 RX ORDER — SEMAGLUTIDE 0.5 MG/.5ML
0.5 INJECTION, SOLUTION SUBCUTANEOUS
Qty: 2 ML | Refills: 2 | Status: SHIPPED | OUTPATIENT
Start: 2025-06-11

## 2025-06-11 NOTE — PROGRESS NOTES
Established Patient - TeleHealth Visit    The patient location is: [place]  The chief complaint leading to consultation is: chronic pain     Visit type: Audiovisual telemedicine visit     Total time spent on the encounter includes Face-to-face time and non-face to face time preparing to see the patient (eg, review of tests), Obtaining and/or reviewing separately obtained history, Documenting clinical information in the electronic or other health record, Independently interpreting results (not separately reported) and communicating results to the patient/family/caregiver, and/or Care coordination (not separately reported).     Each patient to whom he or she provides medical services by telemedicine is:  (1) informed of the relationship between the physician and patient and the respective role of any other health care provider with respect to management of the patient; and (2) notified that he or she may decline to receive medical services by telemedicine and may withdraw from such care at any time.      Chronic Pain -- Established Patient (Follow-up visit)    Referring Physician: Marie Hinkle NP    PCP: Marie Hinkle NP    Chief complaint:  Follow-up     LBP, leg pain          SUBJECTIVE:    Interval History (6/11/2025):  Vince Rosas presents for follow-up after receiving two injections: a left SIJ injection on 5/2/25 (about six weeks ago) and a Left L5/S1 + S1 TF LORENZA on 5/23/25 (about three weeks ago). The SI joint injection provided some relief but did not alleviate all symptoms. The lumbar epidural was more effective, with him experiencing significant improvement for approximately three weeks until two days ago when sciatic nerve pain began to recur. Pain radiates down the leg. Overall, he estimates about 80% improvement with both injections combined.    He has been taking Lyrica (Pregabalin) 225mg, which was recently increased in dosage. While it helps with pain, he reports significant drowsiness as a  side effect, impacting his ability to work. The medication causes immediate sleepiness upon ingestion. It is not a problem at this time because he is off of work but is concerned for when he has to return. He has been attending PT once a week on Wednesday mornings, which has been highly beneficial. He has access to similar PT equipment at his facility and has been utilizing it multiple times between sessions.     Interval history 04/10/2025  Patient logs in 14 minutes late for her scheduled virtual visit    Today patient presents for follow-up for left-sided SI joint and left leg pain.  Patient has continued twice weekly conventional land-based physical therapy at [facility] from January 2025 through current date 04/10/2025 with noticeable improvement in left-sided radicular symptoms.  He continues to report pain overlying the left SI joint which can then radiate down the lateral and posterior aspect of the left lower extremity in L4-S1 distribution to the foot.  He has continued Lyrica and reached a dosage of 150 mg in the evening with questionable improvement in his pain.  Patient would like to pursue interventional treatment to help with his symptoms.      Initial HPI 03/06/2025  Patient arrives 3 minutes late for his clinic visit.    Vince Rosas is a 44 y.o. male with past medical history significant for anxiety/depression, hypertension, obesity, GERD, obstructive sleep apnea who presents to the clinic for the evaluation of lower back and left leg pain.  Patient reports pain began November of 2024 without inciting accident injury or trauma.  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  Today he reports pain which is constant which is rated a 5/10.  Pain at its best is a 5/10 and at its worse is an 8/10.  Pain is described as tingling and pulsing in nature.  Today he reports pain in a bandlike distribution in the lower back which radiates into the  left buttock and down the lateral and posterior aspect of the left lower extremity in L4-S1 distribution to the foot.  Pain is exacerbated with prolonged standing, lifting objects greater than 10 lb, sharp turns to the left or when he is attempting to throw something with his right hand in an overhead position.  Pain is improved with physical therapy.  He has been performing twice weekly physical therapy from January 2025 through March 2025 at [facility] in [place].  He is continuing twice weekly sessions.  Patient takes naproxen very judiciously perhaps 1 or 2 tablets every 4 days.  He has not started Robaxin antispasmodics prescribed by PCP.  He also has trialed heating pad, dry needling and TENS stimulation with marginal improvement in his symptoms.  He has not received prior interventional treatment.  He would like to pursue conservative management currently.    Patient denies night fever/night sweats, urinary incontinence, bowel incontinence, significant weight loss, and significant motor weakness.  Patient reports loss of sensations.        Pain Disability Index (PDI) Score Review:      3/6/2025     9:07 AM   Last 3 PDI Scores   Pain Disability Index (PDI) 35         Non-Pharmacologic Treatments:  Physical Therapy/Home Exercise: yes  Ice/Heat:yes  TENS: yes  Acupuncture: no  Massage: no  Chiropractic: yes    Other: no      Pain Medications:  - Adjuvant Medications: Alleve (Naproxen) and Robaxin ( Methocarbamol)    Pain Procedures:   -5/2/25: left SIJ injection   -5/23/25: Left L5/S1 + S1 TF LORENZA       Review of Systems:   GENERAL:  No weight loss, malaise or fevers.  HEENT:   No recent changes in vision or hearing  NECK:  Negative for lumps, no difficulty with swallowing.  RESPIRATORY:  Negative for cough, wheezing or shortness of breath, patient denies any recent URI.  CARDIOVASCULAR:  Negative for chest pain or palpitations.  GI:  Negative for abdominal discomfort, blood in stools or black  stools or change in bowel habits.  MUSCULOSKELETAL:  See HPI.  SKIN:  Negative for lesions, rash, and itching.  PSYCH:  No mood disorder or recent psychosocial stressors.   HEMATOLOGY/LYMPHOLOGY:  Negative for prolonged bleeding, bruising easily or swollen nodes.    NEURO:   No history of syncope, paralysis, seizures or tremors.  All other reviewed and negative other than HPI.      OBJECTIVE:    Telemedicine Physical Exam:   Vitals:    06/12/25 1536   Height: 6' (1.829 m)     Body mass index is 35.8 kg/m².   (reviewed on 6/12/2025)     (Physical exam performed virtually with patient guided on specific actions and diagnostic maneuvers)  GENERAL: Well appearing, in no acute distress, alert and oriented x3.  Cooperative.  PSYCH:  Mood and affect appropriate.  SKIN: Skin color & texture with no obvious abnormalities.    HEAD/FACE:  Normocephalic, atraumatic.    PULM:  No difficulty breathing. No nasal flaring. No obvious wheezing.  EXTREMITIES: No obvious deformities. Moving all extremities well, appears to have symmetric strength throughout.  MUSCULOSKELETAL: No obvious atrophy abnormalities are noted.   NEURO: No obvious neurologic deficit.   GAIT: sitting.     Physical Exam: last in clinic visit:  GENERAL: Well appearing, in no acute distress, alert and oriented x3.  PSYCH:  Mood and affect appropriate.  SKIN: Skin color, texture, turgor normal, no rashes or lesions.  HEAD/FACE:  Normocephalic, atraumatic. Cranial nerves grossly intact.    CV: RRR with palpation of the radial artery.  PULM: No evidence of respiratory difficulty, symmetric chest rise.  GI:  Soft and non-tender.    BACK: Straight leg raising in the sitting and supine positions is + to radicular pain on L. No pain to palpation over the facet joints of the lumbar spine or spinous processes. Normal range of motion without pain reproduction.  EXTREMITIES: Peripheral joint ROM is full and pain free without obvious instability or laxity in all four  extremities. No deformities, edema, or skin discoloration. Good capillary refill.  MUSCULOSKELETAL: Able to stand on heels & toes on R.   hip provocative maneuvers are negative.    SIJ testing: LEFT  - TTP over SI joint: Present  - Damian's/ Tano's: Positive    - Sacroiliac Distraction Test (anterior pressure): Positive  - Sacroiliac Compression Test (lateral pressure): Positive   - SacralThrust Test (posterior pressure): Positive    Facet loading test is negative bilaterally.   Bilateral upper and lower extremity strength is normal and symmetric.  No atrophy or tone abnormalities are noted.    RIGHT Lower extremity: Hip flexion 5/5, Hip Abduction 5/5, Hip Adduction 5/5, Knee extension 5/5, Knee flexion 5/5, Ankle dorsiflexion5/5, Extensor hallucis longus 5/5, Ankle plantarflexion 5/5  LEFT Lower extremity:  Hip flexion 5/5, Hip Abduction 5/5,Hip Adduction 5/5, Knee extension 5/5, Knee flexion 5/5, Ankle dorsiflexion 5/5, Extensor hallucis longus 5/5, Ankle plantarflexion 5/5  -Normal testing knee (patellar) jerk and ankle (achilles) jerk    NEURO: Bilateral upper and lower extremity coordination and muscle stretch reflexes are physiologic and symmetric. No loss of sensation is noted.  GAIT: normal.        Imaging/ Diagnostic Studies/ Labs (Reviewed on 6/12/2025):    01/13/25 MRI Lumbar Spine Without Contrast  COMPARISON: X-ray dated 12/05/2024    FINDINGS:  There are 5 non-rib-bearing lumbar type vertebrae.  Chronic appearing bilateral L5 pars interarticularis defects noted with associated trace grade 1 anterolisthesis of L5 on S1.  Remaining alignment is unremarkable.  No acute fracture or compression deformity.  No aggressive focal signal abnormality.    Conus medullaris terminates at the L1-L2 level.  Conus medullaris is normal in size and signal.    T12-L1: No significant disc pathology.  No significant spinal canal or neural foraminal stenosis.    L1-L2: No significant disc pathology.  No significant spinal  canal or neural foraminal stenosis.    L2-L3: No significant disc pathology.  No significant spinal canal or neural foraminal stenosis.    L3-L4: No significant disc pathology.  No significant spinal canal or neural foraminal stenosis.    L4-L5: No significant disc pathology.  Mild bilateral facet arthropathy.  No significant spinal canal or neural foraminal stenosis.    L5-S1: Disc desiccation and small circumferential disc bulge.  Mild bilateral facet arthropathy.  No significant spinal canal stenosis.  Moderate left and mild right neural foraminal stenosis.    Paraspinal soft tissues are unremarkable.  Visualized intra-abdominopelvic contents also unremarkable.    Impression  Chronic appearing bilateral L5 pars interarticularis defects with associated trace grade 1 anterolisthesis of L5 on S1.    Lower lumbar level degenerative changes as detailed above.  No significant spinal canal stenosis.  Moderate left and mild right neural foraminal stenosis at L5-S1.            ASSESSMENT: 44 y.o. year old male with     1. Left lumbar radiculopathy  pregabalin (LYRICA) 100 MG capsule      2. Bulge of lumbar disc without myelopathy        3. Arthropathy of left sacroiliac joint        4. Sacroiliitis                 PLAN:   - Interventions:    - S/p left SIJ injection on 5/2/25 then Left L5/S1 + S1 TF LORENZA on 5/23/25 with about 80% pain relief.     - Discussed possibility of repeating epidural injection if needed, noting it cannot be done more frequently than every 3 months due to safety and insurance considerations.    - Anticoagulation use: No no anticoagulation      - Medications:  - Change dosing of Lyrica (pregabalin) 225mg QHS to Lyrica (Pregabalin) 100mg 3 times daily -- to see if drowsiness improves.     - [place]  reviewed and appropriate.      - Therapy:   - We discussed continuing formal physical therapy to help manage the patient/s painful condition. The patient was counseled that muscle strengthening will improve  the long term prognosis in regards to pain and may also help increase range of motion and mobility.  Actively enrolled in once weekly sessions from 01/2025 through current date, 6/12/2025 at [facility] in [place].    - Continue home PT exercises at least every other day.  Avoid excessive bending and lifting heavy objects. Use safe lifting practices.    - Imaging: Reviewed available imaging (MRI lumbar spine).    - Follow up visit: 3 months follow-up  - will send online ticket scheduling on [employer] - virtual visit       Future Appointments   Date Time Provider Department Center   3/19/2026  9:00 AM Niko Vu MD [facility] [place]       - Patient Questions: Answered all of the patient's questions regarding diagnosis, therapy, and treatment.    - This condition does not require this patient to take time off of work, and the primary goal of our Pain Management services is to improve the patient's functional capacity.   - I discussed the risks, benefits, and alternatives to potential treatment options. All questions and concerns were fully addressed today in clinic.         Meli Duckworth PA-C  Interventional Pain Management - [facility]    Disclaimer:  This note was prepared using voice recognition system and is likely to have sound alike errors that may have been overlooked even after proof reading.  Please call me with any questions.     This note was generated with the assistance of ambient listening technology to support clinical documentation. The content has been reviewed and edited for accuracy by the author, though minor syntax or spelling errors may remain. Please contact the author of this note for any clarification.

## 2025-06-12 ENCOUNTER — OFFICE VISIT (OUTPATIENT)
Dept: PAIN MEDICINE | Facility: CLINIC | Age: 45
End: 2025-06-12
Payer: COMMERCIAL

## 2025-06-12 ENCOUNTER — PATIENT MESSAGE (OUTPATIENT)
Dept: PAIN MEDICINE | Facility: CLINIC | Age: 45
End: 2025-06-12

## 2025-06-12 VITALS — BODY MASS INDEX: 35.8 KG/M2 | HEIGHT: 72 IN

## 2025-06-12 DIAGNOSIS — M51.369 BULGE OF LUMBAR DISC WITHOUT MYELOPATHY: ICD-10-CM

## 2025-06-12 DIAGNOSIS — M46.1 SACROILIITIS: ICD-10-CM

## 2025-06-12 DIAGNOSIS — M54.16 LEFT LUMBAR RADICULOPATHY: Primary | ICD-10-CM

## 2025-06-12 DIAGNOSIS — M47.818 ARTHROPATHY OF LEFT SACROILIAC JOINT: ICD-10-CM

## 2025-06-12 PROCEDURE — 1159F MED LIST DOCD IN RCRD: CPT | Mod: CPTII,95,, | Performed by: PHYSICIAN ASSISTANT

## 2025-06-12 PROCEDURE — 98006 SYNCH AUDIO-VIDEO EST MOD 30: CPT | Mod: 95,,, | Performed by: PHYSICIAN ASSISTANT

## 2025-06-12 PROCEDURE — 1160F RVW MEDS BY RX/DR IN RCRD: CPT | Mod: CPTII,95,, | Performed by: PHYSICIAN ASSISTANT

## 2025-06-12 RX ORDER — PREGABALIN 100 MG/1
100 CAPSULE ORAL 3 TIMES DAILY
Qty: 90 CAPSULE | Refills: 1 | Status: SHIPPED | OUTPATIENT
Start: 2025-06-12

## 2025-06-25 ENCOUNTER — OFFICE VISIT (OUTPATIENT)
Dept: INTERNAL MEDICINE | Facility: CLINIC | Age: 45
End: 2025-06-25
Payer: COMMERCIAL

## 2025-06-25 DIAGNOSIS — M54.16 LUMBAR RADICULOPATHY: ICD-10-CM

## 2025-06-25 DIAGNOSIS — I10 ESSENTIAL HYPERTENSION: Primary | ICD-10-CM

## 2025-06-25 DIAGNOSIS — M51.369 BULGE OF LUMBAR DISC WITHOUT MYELOPATHY: ICD-10-CM

## 2025-06-25 PROBLEM — R03.0 ELEVATED BLOOD PRESSURE READING IN OFFICE WITHOUT DIAGNOSIS OF HYPERTENSION: Status: RESOLVED | Noted: 2023-03-14 | Resolved: 2025-06-25

## 2025-06-25 NOTE — LETTER
June 25, 2025      [facility]  [address]  [place] [address]  Phone: [phone]  Fax: [phone]       Patient: Vince Rosas   YOB: 1980  Date of Visit: 06/25/2025    To Whom It May Concern:    Bertin Rosas  was at [facility] on 06/25/2025. 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     These If you have any questions or concerns, or if I can be of further assistance, please do not hesitate to contact me.    Sincerely,      Marie Hinkle NP

## 2025-06-25 NOTE — PROGRESS NOTES
The patient location is: [place]  The chief complaint leading to consultation is: release for work.    Visit type: audiovisual    Face to Face time with patient: 10 minutes  20 minutes of total time spent on the encounter, which includes face to face time and non-face to face time preparing to see the patient (eg, review of tests), Obtaining and/or reviewing separately obtained history, Documenting clinical information in the electronic or other health record, Independently interpreting results (not separately reported) and communicating results to the patient/family/caregiver, or Care coordination (not separately reported).         Each patient to whom he or she provides medical services by telemedicine is:  (1) informed of the relationship between the physician and patient and the respective role of any other health care provider with respect to management of the patient; and (2) notified that he or she may decline to receive medical services by telemedicine and may withdraw from such care at any time.    Notes:   Subjective:       Patient ID: Vince Rosas is a 44 y.o. male.    Chief Complaint: Letter for School/Work    Mr. Rosas presents to visit for release to return to work. Has been out on FMLA for lumbar radiculopathy and bulging disc.   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   Has been doing PT at [facility] since the end of January.    Had LORENZA and spinal nerve block performed last month with pain management.   Feels that he is back to pretty much back to normal except pain occasional pain with quick turns and movements.   Continues to do exercises at home, and has been able to resume working out.   Has had some weight loss with exercises and wegovy. Tolerating well.         Problem List[1]    Past Surgical History:   Procedure Laterality Date    INJECTION OF ANESTHETIC AGENT INTO SACROILIAC JOINT Left 5/2/2025    Procedure: Ledt sided Sacroiliac Joint Injection;   Surgeon: Veronica Shankar MD;  Location: [facility]IN [facility];  Service: Pain Management;  Laterality: Left;    SELECTIVE INJECTION OF ANESTHETIC AGENT AROUND LUMBAR SPINAL NERVE ROOT BY TRANSFORAMINAL APPROACH Left 5/23/2025    Procedure: Left sided S1 and L5/S1 TF LORENZA;  Surgeon: Veronica Shankar MD;  Location: [facility];  Service: Pain Management;  Laterality: Left;       Current Medications[2]    Review of Systems   Constitutional:  Positive for activity change (improving). Negative for unexpected weight change.   HENT:  Negative for hearing loss, rhinorrhea and trouble swallowing.    Eyes:  Negative for discharge and visual disturbance.   Respiratory:  Negative for chest tightness and wheezing.    Cardiovascular:  Negative for chest pain and palpitations.   Gastrointestinal:  Negative for blood in stool, constipation, diarrhea and vomiting.   Endocrine: Negative for polydipsia and polyuria.   Genitourinary:  Negative for difficulty urinating, hematuria and urgency.   Musculoskeletal:  Positive for back pain (improved). Negative for arthralgias, joint swelling and neck pain.   Neurological:  Negative for weakness and headaches.   Psychiatric/Behavioral:  Negative for confusion and dysphoric mood.        Objective:   /72 Comment: last recorded blood pressure     Physical Exam  Constitutional:       General: He is not in acute distress.     Appearance: Normal appearance. He is not ill-appearing.   HENT:      Head: Normocephalic.   Pulmonary:      Effort: Pulmonary effort is normal. No respiratory distress.   Neurological:      Mental Status: He is alert and oriented to person, place, and time.   Psychiatric:         Mood and Affect: Mood normal.         Assessment & Plan     Problem List Items Addressed This Visit          Neuro    Bulge of lumbar disc without myelopathy    Current Assessment & Plan   Continue PT exercises at home. Keep follow up with pain management as scheduled. Ok to return to work.           "Lumbar radiculopathy    Current Assessment & Plan   Continue PT exercises at home. Keep follow up with pain management as scheduled. Ok to return to work.             Cardiac/Vascular    Essential hypertension - Primary    Current Assessment & Plan   Previously well controlled. Continue current medication.            Endocrine    BMI 36.0-36.9,adult    Current Assessment & Plan   Tolerating wegovy.           Work excuse and release provided with accommodations of getting up and moving from seat at board every 1-2 hours to reduce prolonged sitting and lifting of 50+ lbs.       Follow up if symptoms worsen or fail to improve.           Portions of this note may have been created with voice recognition software. Occasional "wrong-word" or "sound-a-like" substitutions may have occurred due to the inherent limitations of voice recognition software. Please, read the note carefully and recognize, using context, where substitutions have occurred.                                [1]   Patient Active Problem List  Diagnosis    Gastroesophageal reflux disease without esophagitis    Acute left-sided low back pain with left-sided sciatica    Snores    Essential hypertension    BMI 36.0-36.9,adult    CANDIDA (obstructive sleep apnea)    Bulge of lumbar disc without myelopathy    Sacroiliitis    Lumbar radiculopathy   [2]   Current Outpatient Medications:     hydroCHLOROthiazide (HYDRODIURIL) 25 MG tablet, TAKE ONE TABLET BY MOUTH EVERY DAY, Disp: 90 tablet, Rfl: 1    methocarbamoL (ROBAXIN) 500 MG Tab, Take 1 tablet (500 mg total) by mouth 3 (three) times daily as needed (muscle pain). May cause drowsiness., Disp: 30 tablet, Rfl: 0    naproxen (NAPROSYN) 500 MG tablet, Take 1 tablet (500 mg total) by mouth 2 (two) times daily as needed., Disp: 40 tablet, Rfl: 5    pregabalin (LYRICA) 100 MG capsule, Take 1 capsule (100 mg total) by mouth 3 (three) times daily., Disp: 90 capsule, Rfl: 1    semaglutide, weight loss, (WEGOVY) 0.5 mg/0.5 " mL PnIj, Inject 0.5 mg into the skin every 7 days., Disp: 2 mL, Rfl: 2

## 2025-06-26 VITALS — DIASTOLIC BLOOD PRESSURE: 72 MMHG | SYSTOLIC BLOOD PRESSURE: 118 MMHG

## 2025-06-26 NOTE — ASSESSMENT & PLAN NOTE
Continue PT exercises at home. Keep follow up with pain management as scheduled. Ok to return to work.

## 2025-08-06 ENCOUNTER — OFFICE VISIT (OUTPATIENT)
Dept: INTERNAL MEDICINE | Facility: CLINIC | Age: 45
End: 2025-08-06
Payer: COMMERCIAL

## 2025-08-06 DIAGNOSIS — I10 ESSENTIAL HYPERTENSION: ICD-10-CM

## 2025-08-06 DIAGNOSIS — M54.16 LUMBAR RADICULOPATHY: Primary | ICD-10-CM

## 2025-08-06 DIAGNOSIS — E66.01 SEVERE OBESITY WITH BODY MASS INDEX (BMI) OF 36.0 TO 36.9 WITH SERIOUS COMORBIDITY: ICD-10-CM

## 2025-08-06 RX ORDER — SEMAGLUTIDE 0.5 MG/.5ML
0.5 INJECTION, SOLUTION SUBCUTANEOUS
Qty: 2 ML | Refills: 2 | Status: SHIPPED | OUTPATIENT
Start: 2025-08-06 | End: 2025-08-06 | Stop reason: DRUGHIGH

## 2025-08-06 RX ORDER — SEMAGLUTIDE 1 MG/.5ML
1 INJECTION, SOLUTION SUBCUTANEOUS
Qty: 2 ML | Refills: 5 | Status: SHIPPED | OUTPATIENT
Start: 2025-08-06